# Patient Record
Sex: MALE | Race: WHITE | NOT HISPANIC OR LATINO | Employment: OTHER | URBAN - METROPOLITAN AREA
[De-identification: names, ages, dates, MRNs, and addresses within clinical notes are randomized per-mention and may not be internally consistent; named-entity substitution may affect disease eponyms.]

---

## 2018-06-29 ENCOUNTER — HOSPITAL ENCOUNTER (EMERGENCY)
Facility: HOSPITAL | Age: 33
Discharge: HOME | End: 2018-06-29
Admitting: EMERGENCY MEDICINE
Payer: COMMERCIAL

## 2018-06-29 VITALS
OXYGEN SATURATION: 98 % | RESPIRATION RATE: 16 BRPM | DIASTOLIC BLOOD PRESSURE: 89 MMHG | WEIGHT: 170 LBS | HEART RATE: 76 BPM | SYSTOLIC BLOOD PRESSURE: 150 MMHG | TEMPERATURE: 98 F

## 2018-06-29 DIAGNOSIS — L03.019 PARONYCHIA OF FINGER: Primary | ICD-10-CM

## 2018-06-29 PROCEDURE — 99283 EMERGENCY DEPT VISIT LOW MDM: CPT

## 2018-06-29 RX ORDER — BUPIVACAINE HYDROCHLORIDE 2.5 MG/ML
5 INJECTION, SOLUTION EPIDURAL; INFILTRATION; INTRACAUDAL ONCE
Status: COMPLETED | OUTPATIENT
Start: 2018-06-29 | End: 2018-06-29

## 2018-06-29 RX ORDER — CEPHALEXIN 250 MG/1
500 CAPSULE ORAL 4 TIMES DAILY
Qty: 28 CAPSULE | Refills: 0 | Status: SHIPPED | OUTPATIENT
Start: 2018-06-29 | End: 2018-07-06

## 2018-06-29 RX ORDER — SULFAMETHOXAZOLE AND TRIMETHOPRIM 800; 160 MG/1; MG/1
1 TABLET ORAL 2 TIMES DAILY
Qty: 14 TABLET | Refills: 0 | Status: SHIPPED | OUTPATIENT
Start: 2018-06-29 | End: 2018-07-06

## 2018-06-29 RX ORDER — LIDOCAINE HYDROCHLORIDE 10 MG/ML
INJECTION, SOLUTION EPIDURAL; INFILTRATION; INTRACAUDAL; PERINEURAL
Status: COMPLETED
Start: 2018-06-29 | End: 2018-06-29

## 2018-06-29 RX ORDER — IBUPROFEN 600 MG/1
600 TABLET ORAL EVERY 6 HOURS PRN
Qty: 15 TABLET | Refills: 0 | Status: SHIPPED | OUTPATIENT
Start: 2018-06-29 | End: 2019-01-01

## 2018-06-29 RX ORDER — ALBUTEROL SULFATE 90 UG/1
2 AEROSOL, METERED RESPIRATORY (INHALATION) EVERY 6 HOURS
COMMUNITY
Start: 2017-10-31 | End: 2018-10-31

## 2018-06-29 RX ORDER — LIDOCAINE HYDROCHLORIDE 10 MG/ML
5 INJECTION, SOLUTION EPIDURAL; INFILTRATION; INTRACAUDAL; PERINEURAL ONCE
Status: COMPLETED | OUTPATIENT
Start: 2018-06-29 | End: 2018-06-29

## 2018-06-29 RX ADMIN — BUPIVACAINE HYDROCHLORIDE 5 ML: 2.5 INJECTION, SOLUTION EPIDURAL; INFILTRATION; INTRACAUDAL at 07:03

## 2018-06-29 RX ADMIN — LIDOCAINE HYDROCHLORIDE 5 ML: 10 INJECTION, SOLUTION EPIDURAL; INFILTRATION; INTRACAUDAL; PERINEURAL at 07:31

## 2018-06-29 RX ADMIN — LIDOCAINE HYDROCHLORIDE 5 ML: 10 INJECTION, SOLUTION EPIDURAL; INFILTRATION; INTRACAUDAL; PERINEURAL at 07:02

## 2018-06-29 NOTE — ED PROVIDER NOTES
History  Chief Complaint   Patient presents with    Thumb Swelling     pt c/o right thumb swelling since yesterday  denies any acute injury/strain       History provided by:  Patient  Hand Pain   Location:  Right thumb  Severity:  Moderate  Onset quality:  Gradual  Duration:  2 days  Timing:  Constant  Progression:  Worsening  Chronicity:  New  Context:  Erythema and swelling along the paronychia of the right thumb  No injury   Relieved by:  Nothing tried  Worsened by:  Palpation  Associated symptoms: no abdominal pain, no chest pain, no congestion, no cough, no diarrhea, no ear pain, no fatigue, no fever, no headaches, no loss of consciousness, no myalgias, no nausea, no rash, no rhinorrhea, no shortness of breath, no sore throat, no vomiting and no wheezing        Prior to Admission Medications   Prescriptions Last Dose Informant Patient Reported? Taking? albuterol (PROVENTIL HFA,VENTOLIN HFA) 90 mcg/act inhaler   Yes Yes   Sig: Inhale 2 puffs every 6 (six) hours      Facility-Administered Medications: None       Past Medical History:   Diagnosis Date    Clubbing of fingers        History reviewed  No pertinent surgical history  History reviewed  No pertinent family history  I have reviewed and agree with the history as documented  Social History   Substance Use Topics    Smoking status: Current Every Day Smoker     Packs/day: 1 00     Types: Cigarettes    Smokeless tobacco: Never Used    Alcohol use Yes      Comment: daily beer        Review of Systems   Constitutional: Negative for activity change, appetite change, chills, fatigue and fever  HENT: Negative for congestion, ear pain, rhinorrhea and sore throat  Respiratory: Negative for cough, shortness of breath and wheezing  Cardiovascular: Negative for chest pain  Gastrointestinal: Negative for abdominal pain, diarrhea, nausea and vomiting  Musculoskeletal: Negative for myalgias  Skin: Positive for color change and wound   Negative for rash  Neurological: Negative for loss of consciousness and headaches  Psychiatric/Behavioral: Negative for confusion  Physical Exam  Physical Exam   Constitutional: He is oriented to person, place, and time  He appears well-developed and well-nourished  No distress  HENT:   Head: Normocephalic  Right Ear: External ear normal    Left Ear: External ear normal    Nose: Nose normal    Eyes: Conjunctivae are normal  Left eye exhibits no discharge  Musculoskeletal:   Examination of the right thumb-there is an infected paronychia present  There is full range of motion of the thumb in all planes  Neurological: He is alert and oriented to person, place, and time  Skin: He is not diaphoretic  There is erythema  Examination of the right thumb-there is inflammation to the paronychia of the right thumb  Will plan I and D to drain the area  There is fluctuance present  Capillary refills less than 2 seconds  Psychiatric: He has a normal mood and affect  Nursing note and vitals reviewed        Vital Signs  ED Triage Vitals   Temperature Pulse Respirations Blood Pressure SpO2   06/29/18 0654 06/29/18 0651 06/29/18 0651 06/29/18 0651 06/29/18 0651   98 °F (36 7 °C) 80 17 156/96 100 %      Temp Source Heart Rate Source Patient Position - Orthostatic VS BP Location FiO2 (%)   06/29/18 0654 06/29/18 0651 06/29/18 0651 06/29/18 0651 --   Oral Monitor Sitting Left arm       Pain Score       06/29/18 0651       Worst Possible Pain           Vitals:    06/29/18 0651 06/29/18 0738   BP: 156/96 150/89   Pulse: 80 76   Patient Position - Orthostatic VS: Sitting Sitting       Visual Acuity      ED Medications  Medications   lidocaine (PF) (XYLOCAINE-MPF) 1 % injection 5 mL (5 mL Infiltration Given by Other 6/29/18 0702)   bupivacaine (PF) (MARCAINE) 0 25 % injection 5 mL (5 mL Infiltration Given by Other 6/29/18 0703)   lidocaine (PF) (XYLOCAINE-MPF) 1 % injection 5 mL (5 mL Infiltration Given 6/29/18 0731) Diagnostic Studies  Results Reviewed     None                 No orders to display              Procedures  Incision/Drainage  Date/Time: 6/29/2018 7:17 AM  Performed by: Carmelina Sal  Authorized by: Carmelina Sal     Patient location:  ED  Other Assisting Provider: No    Consent:     Consent obtained:  Verbal    Consent given by:  Patient    Risks discussed:  Incomplete drainage, pain, bleeding and infection    Alternatives discussed:  No treatment  Universal protocol:     Procedure explained and questions answered to patient or proxy's satisfaction: yes      Site/side marked: yes      Immediately prior to procedure a time out was called: yes      Patient identity confirmed:  Verbally with patient  Location:     Indications for incision and drainage: paronychia  Location: right thumb  Pre-procedure details:     Skin preparation:  Betadine  Anesthesia (see MAR for exact dosages): Anesthesia method:  Local infiltration    Local anesthetic:  Bupivacaine 0 25% w/o epi and lidocaine 1% w/o epi  Procedure details:     Complexity:  Simple    Needle aspiration: no      Incision types:  Single straight    Scalpel blade:  11    Approach:  Open    Incision depth:  Skin    Wound management:  Irrigated with saline    Irrigation with saline:  Copiously    Drainage:  Purulent    Drainage amount:  Scant    Wound treatment:  Wound left open    Packing materials:  None  Post-procedure details:     Patient tolerance of procedure:   Tolerated well, no immediate complications           Phone Contacts  ED Phone Contact    ED Course                               MDM  Number of Diagnoses or Management Options  Paronychia of finger: new and does not require workup  Risk of Complications, Morbidity, and/or Mortality  Presenting problems: moderate  Diagnostic procedures: moderate  Management options: moderate  General comments: Patient presents emergency room with complaints of pain and swelling over his right thumb  He was seen and evaluated and diagnosed with a infected paronychia  Incision and drainage was performed  The patient was placed on antibiotics  He was instructed to remove the dressings tomorrow and start warm soaks for 20 minutes 3 to 4 times a day  He will return to the emergency room if he has any further problems  Patient Progress  Patient progress: stable    CritCare Time    Disposition  Final diagnoses:   Paronychia of finger - right thumb     Time reflects when diagnosis was documented in both MDM as applicable and the Disposition within this note     Time User Action Codes Description Comment    6/29/2018  7:19 AM Sallie Bui [[id number]] Paronychia of finger     6/29/2018  7:20 AM Sole Rubin [[id number]] Paronychia of finger right thumb      ED Disposition     ED Disposition Condition Comment    Discharge  Shauna Octavioist discharge to home/self care  Condition at discharge: Good        Follow-up Information     Follow up With Specialties Details Why Contact Info    Mikal Deluca MD Sleep Medicine, Family Medicine In 2 days  [address] [place] [address]  N  [phone]            Discharge Medication List as of 6/29/2018  7:23 AM      START taking these medications    Details   cephalexin (KEFLEX) 250 mg capsule Take 2 capsules (500 mg total) by mouth 4 (four) times a day for 7 days, Starting Fri 6/29/2018, Until Fri 7/6/2018, Print      ibuprofen (MOTRIN) 600 mg tablet Take 1 tablet (600 mg total) by mouth every 6 (six) hours as needed for mild pain for up to 15 doses, Starting Fri 6/29/2018, Print      sulfamethoxazole-trimethoprim (BACTRIM DS) 800-160 mg per tablet Take 1 tablet by mouth 2 (two) times a day for 7 days smx-tmp DS (BACTRIM) 800-160 mg tabs (1tab q12 D10), Starting Fri 6/29/2018, Until Fri 7/6/2018, Print           No discharge procedures on file      ED Provider  Electronically Signed by           Lashaun Springer PA-C  06/29/18 Annie Myrick

## 2018-06-29 NOTE — DISCHARGE INSTRUCTIONS
Paronychia   WHAT YOU NEED TO KNOW:   Paronychia is an infection of your nail fold caused by bacteria or a fungus  The nail fold is the skin around your nail  Paronychia may happen suddenly and last for 6 weeks or longer  You may have paronychia on more than 1 finger or toe  DISCHARGE INSTRUCTIONS:   Medicines:   · Td vaccine  is a booster shot used to help prevent tetanus and diphtheria  The Td booster may be given to adolescents and adults every 10 years or for certain wounds and injuries  · Antibiotics: This medicine will help fight or prevent an infection  It may be given as a pill, cream, or ointment  · Steroids: This medicine will help decrease inflammation  It may be given as a pill, cream, or ointment  · Antifungal medicine: This medicine helps kill fungus that may be causing your infection  It may be given as a cream or ointment  · NSAIDs:  These medicines decrease pain and swelling  NSAIDs are available without a doctor's order  Ask your healthcare provider which medicine is right for you  Ask how much to take and when to take it  Take as directed  NSAIDs can cause stomach bleeding and kidney problems if not taken correctly  · Take your medicine as directed  Contact your healthcare provider if you think your medicine is not helping or if you have side effects  Tell him of her if you are allergic to any medicine  Keep a list of the medicines, vitamins, and herbs you take  Include the amounts, and when and why you take them  Bring the list or the pill bottles to follow-up visits  Carry your medicine list with you in case of an emergency  Follow up with your healthcare provider as directed:  Write down your questions so you remember to ask them during your visits  Self-care:   · Soak your nail:  Soak your nail in a mixture of equal parts vinegar and water 3 or 4 times each day  This will help decrease inflammation      · Apply a warm compress:  Soak a washcloth in warm water and place it on your nail  This will help decrease inflammation  · Elevate:  Raise your nail above the level of your heart as often as you can  This will help decrease swelling and pain  Prop your nail on pillows or blankets to keep it elevated comfortably  · Use lotion:  Apply lotion after you wash your hands  This will prevent your skin from becoming too dry  Prevent paronychia:   · Avoid chemicals and allergens that may harm your skin and nails  This includes soaps, laundry detergents, and nail products  · Keep your nails clean and dry  Avoid soaking your nails in water  Use cotton-lined rubber gloves or wear 2 rubber gloves if you work with food or water  The gloves will help protect your nail folds  · Keep your nails short  Do not bite your nails, pick at your hangnails, suck your fingers, or wear fake nails  Bring your own nail tools when you go to the nail salon  Contact your healthcare provider if:   · Your nail becomes loose, deformed, or falls off  · You have a large abscess on your nail  · You have questions or concerns about your condition or care  Return to the emergency department if:   · You have severe nail pain  · The inflammation spreads to your hand or arm  © 2017 [facility] Information is for End User's use only and may not be sold, redistributed or otherwise used for commercial purposes  All illustrations and images included in CareNotes® are the copyrighted property of [employer]  or [employer]  The above information is an  only  It is not intended as medical advice for individual conditions or treatments  Talk to your doctor, nurse or pharmacist before following any medical regimen to see if it is safe and effective for you

## 2018-07-02 ENCOUNTER — HOSPITAL ENCOUNTER (EMERGENCY)
Facility: HOSPITAL | Age: 33
Discharge: HOME | End: 2018-07-02
Attending: EMERGENCY MEDICINE
Payer: COMMERCIAL

## 2018-07-02 VITALS
OXYGEN SATURATION: 98 % | WEIGHT: 149.69 LBS | RESPIRATION RATE: 16 BRPM | DIASTOLIC BLOOD PRESSURE: 84 MMHG | SYSTOLIC BLOOD PRESSURE: 142 MMHG | TEMPERATURE: 97.4 F | HEART RATE: 96 BPM

## 2018-07-02 DIAGNOSIS — L03.011 PARONYCHIA OF RIGHT THUMB: Primary | ICD-10-CM

## 2018-07-02 PROCEDURE — 99283 EMERGENCY DEPT VISIT LOW MDM: CPT

## 2018-07-02 RX ORDER — LIDOCAINE HYDROCHLORIDE 10 MG/ML
5 INJECTION, SOLUTION EPIDURAL; INFILTRATION; INTRACAUDAL; PERINEURAL ONCE
Status: COMPLETED | OUTPATIENT
Start: 2018-07-02 | End: 2018-07-02

## 2018-07-02 RX ORDER — BACITRACIN, NEOMYCIN, POLYMYXIN B 400; 3.5; 5 [USP'U]/G; MG/G; [USP'U]/G
1 OINTMENT TOPICAL ONCE
Status: COMPLETED | OUTPATIENT
Start: 2018-07-02 | End: 2018-07-02

## 2018-07-02 RX ORDER — CLINDAMYCIN HYDROCHLORIDE 300 MG/1
300 CAPSULE ORAL EVERY 6 HOURS
Qty: 20 CAPSULE | Refills: 0 | Status: SHIPPED | OUTPATIENT
Start: 2018-07-02 | End: 2018-07-07

## 2018-07-02 RX ADMIN — LIDOCAINE HYDROCHLORIDE 5 ML: 10 INJECTION, SOLUTION EPIDURAL; INFILTRATION; INTRACAUDAL; PERINEURAL at 15:35

## 2018-07-02 RX ADMIN — BACITRACIN, NEOMYCIN, POLYMYXIN B 1 SMALL APPLICATION: 400; 3.5; 5 OINTMENT TOPICAL at 16:04

## 2018-07-02 NOTE — ED PROVIDER NOTES
History  Chief Complaint   Patient presents with    Thumb Swelling     pt c/o right thumb swelling  was seen here and had it drained but now thinks it is worse  Pt  Was here on 6/29 for a paronychia of his R thumb - he had it I&D'ed and was placed on keflex and bactrim - it's getting better but base of R thumbnail is swollen now  No fevers, no n/v            Prior to Admission Medications   Prescriptions Last Dose Informant Patient Reported? Taking? albuterol (PROVENTIL HFA,VENTOLIN HFA) 90 mcg/act inhaler   Yes No   Sig: Inhale 2 puffs every 6 (six) hours   cephalexin (KEFLEX) 250 mg capsule   No No   Sig: Take 2 capsules (500 mg total) by mouth 4 (four) times a day for 7 days   ibuprofen (MOTRIN) 600 mg tablet   No No   Sig: Take 1 tablet (600 mg total) by mouth every 6 (six) hours as needed for mild pain for up to 15 doses   sulfamethoxazole-trimethoprim (BACTRIM DS) 800-160 mg per tablet   No No   Sig: Take 1 tablet by mouth 2 (two) times a day for 7 days smx-tmp DS (BACTRIM) 800-160 mg tabs (1tab q12 D10)      Facility-Administered Medications: None       Past Medical History:   Diagnosis Date    Clubbing of fingers        History reviewed  No pertinent surgical history  History reviewed  No pertinent family history  I have reviewed and agree with the history as documented  Social History   Substance Use Topics    Smoking status: Current Every Day Smoker     Packs/day: 1 00     Types: Cigarettes    Smokeless tobacco: Never Used    Alcohol use Yes      Comment: daily beer        Review of Systems   Constitutional: Negative for fever  Respiratory: Negative for shortness of breath  Cardiovascular: Negative for chest pain  Gastrointestinal: Negative for abdominal pain  Skin: Positive for wound  Neurological: Negative for weakness  Physical Exam  Physical Exam   Constitutional: He appears well-developed and well-nourished  HENT:   Head: Normocephalic and atraumatic     Neck: Normal range of motion  Pulmonary/Chest: Effort normal  No respiratory distress  Musculoskeletal: Normal range of motion  Neurological: He is alert  Skin:    R thumbnail - proximal end (base of thumbnail) with swelling/redness/tenderness, +n/v intact, good cap refill  Vital Signs  ED Triage Vitals [07/02/18 1440]   Temperature Pulse Respirations Blood Pressure SpO2   (!) 97 4 °F (36 3 °C) 100 17 (!) 180/93 98 %      Temp Source Heart Rate Source Patient Position - Orthostatic VS BP Location FiO2 (%)   Oral Monitor Sitting Right arm --      Pain Score       No Pain           Vitals:    07/02/18 1440 07/02/18 1442 07/02/18 1445   BP: (!) 180/93 142/84 142/84   Pulse: 100  96   Patient Position - Orthostatic VS: Sitting Sitting        Visual Acuity      ED Medications  Medications   lidocaine (PF) (XYLOCAINE-MPF) 1 % injection 5 mL (5 mL Infiltration Given 7/2/18 1535)   neomycin-bacitracin-polymyxin b (NEOSPORIN) ointment 1 small application (1 small application Topical Given 7/2/18 1604)       Diagnostic Studies  Results Reviewed     None                 No orders to display              Procedures  Incision/Drainage  Date/Time: 7/2/2018 4:00 PM  Performed by: MINAL Emerson  Authorized by: MINAL Emerson     Patient location:  ED  Consent:     Consent obtained:  Verbal    Consent given by:  Patient    Risks discussed:  Bleeding, incomplete drainage, pain and infection    Alternatives discussed:  No treatment  Universal protocol:     Patient identity confirmed:  Verbally with patient and arm band  Location:     Indications for incision and drainage: paronychia  Size:  1cm    Location: R thumb  Pre-procedure details:     Skin preparation:  Betadine  Anesthesia (see MAR for exact dosages):      Anesthesia method:  Local infiltration    Local anesthetic:  Lidocaine 1% w/o epi (2mL)  Procedure details:     Complexity:  Simple    Incision types:  Stab incision    Scalpel blade:  11 Approach:  Open    Incision depth:  Skin    Wound management:  Probed and deloculated    Drainage:  Bloody    Drainage amount: Moderate    Wound treatment:  Wound left open    Packing materials:  None  Post-procedure details:     Patient tolerance of procedure: Tolerated well, no immediate complications  Comments:      Incision made along proximal nail of R thumb (along the base), swelling decreased after drainage , pt  Felt better  Phone Contacts  ED Phone Contact    ED Course                               [facility]  CritCare Time    Disposition  Final diagnoses:   Paronychia of right thumb     Time reflects when diagnosis was documented in both MDM as applicable and the Disposition within this note     Time User Action Codes Description Comment    7/2/2018  4:01 PM Sophia Dodd Add [[id number]] Paronychia of right thumb       ED Disposition     ED Disposition Condition Comment    Discharge  Cleatis Binning discharge to home/self care      Condition at discharge: Stable        Follow-up Information     Follow up With Specialties Details Why Contact Info    Bren Mtz MD Sleep Medicine, Family Medicine   [address] [place] [address]  N  [phone]            Discharge Medication List as of 7/2/2018  4:03 PM      START taking these medications    Details   clindamycin (CLEOCIN) 300 MG capsule Take 1 capsule (300 mg total) by mouth every 6 (six) hours for 5 days, Starting Mon 7/2/2018, Until Sat 7/7/2018, Print         CONTINUE these medications which have NOT CHANGED    Details   albuterol (PROVENTIL HFA,VENTOLIN HFA) 90 mcg/act inhaler Inhale 2 puffs every 6 (six) hours, Starting Tue 10/31/2017, Until Wed 10/31/2018, Historical Med      cephalexin (KEFLEX) 250 mg capsule Take 2 capsules (500 mg total) by mouth 4 (four) times a day for 7 days, Starting Fri 6/29/2018, Until Fri 7/6/2018, Print      ibuprofen (MOTRIN) 600 mg tablet Take 1 tablet (600 mg total) by mouth every 6 (six) hours as needed for mild pain for up to 15 doses, Starting Fri 6/29/2018, Print      sulfamethoxazole-trimethoprim (BACTRIM DS) 800-160 mg per tablet Take 1 tablet by mouth 2 (two) times a day for 7 days smx-tmp DS (BACTRIM) 800-160 mg tabs (1tab q12 D10), Starting Fri 6/29/2018, Until Fri 7/6/2018, Print           No discharge procedures on file      ED Provider  Electronically Signed by           Julio Guillory MD  07/02/18 0068

## 2018-07-02 NOTE — DISCHARGE INSTRUCTIONS
Motrin/tylenol, keep wound clean    Paronychia   WHAT YOU NEED TO KNOW:   Paronychia is an infection of your nail fold caused by bacteria or a fungus  The nail fold is the skin around your nail  Paronychia may happen suddenly and last for 6 weeks or longer  You may have paronychia on more than 1 finger or toe  DISCHARGE INSTRUCTIONS:   Medicines:   · Td vaccine  is a booster shot used to help prevent tetanus and diphtheria  The Td booster may be given to adolescents and adults every 10 years or for certain wounds and injuries  · Antibiotics: This medicine will help fight or prevent an infection  It may be given as a pill, cream, or ointment  · Steroids: This medicine will help decrease inflammation  It may be given as a pill, cream, or ointment  · Antifungal medicine: This medicine helps kill fungus that may be causing your infection  It may be given as a cream or ointment  · NSAIDs:  These medicines decrease pain and swelling  NSAIDs are available without a doctor's order  Ask your healthcare provider which medicine is right for you  Ask how much to take and when to take it  Take as directed  NSAIDs can cause stomach bleeding and kidney problems if not taken correctly  · Take your medicine as directed  Contact your healthcare provider if you think your medicine is not helping or if you have side effects  Tell him of her if you are allergic to any medicine  Keep a list of the medicines, vitamins, and herbs you take  Include the amounts, and when and why you take them  Bring the list or the pill bottles to follow-up visits  Carry your medicine list with you in case of an emergency  Follow up with your healthcare provider as directed:  Write down your questions so you remember to ask them during your visits  Self-care:   · Soak your nail:  Soak your nail in a mixture of equal parts vinegar and water 3 or 4 times each day  This will help decrease inflammation      · Apply a warm compress:  Soak a washcloth in warm water and place it on your nail  This will help decrease inflammation  · Elevate:  Raise your nail above the level of your heart as often as you can  This will help decrease swelling and pain  Prop your nail on pillows or blankets to keep it elevated comfortably  · Use lotion:  Apply lotion after you wash your hands  This will prevent your skin from becoming too dry  Prevent paronychia:   · Avoid chemicals and allergens that may harm your skin and nails  This includes soaps, laundry detergents, and nail products  · Keep your nails clean and dry  Avoid soaking your nails in water  Use cotton-lined rubber gloves or wear 2 rubber gloves if you work with food or water  The gloves will help protect your nail folds  · Keep your nails short  Do not bite your nails, pick at your hangnails, suck your fingers, or wear fake nails  Bring your own nail tools when you go to the nail salon  Contact your healthcare provider if:   · Your nail becomes loose, deformed, or falls off  · You have a large abscess on your nail  · You have questions or concerns about your condition or care  Return to the emergency department if:   · You have severe nail pain  · The inflammation spreads to your hand or arm  © 2017 [address] Information is for End User's use only and may not be sold, redistributed or otherwise used for commercial purposes  All illustrations and images included in CareNotes® are the copyrighted property of [employer]  or [employer]  The above information is an  only  It is not intended as medical advice for individual conditions or treatments  Talk to your doctor, nurse or pharmacist before following any medical regimen to see if it is safe and effective for you

## 2018-11-07 ENCOUNTER — APPOINTMENT (EMERGENCY)
Dept: RADIOLOGY | Facility: HOSPITAL | Age: 33
End: 2018-11-07

## 2018-11-07 ENCOUNTER — HOSPITAL ENCOUNTER (EMERGENCY)
Facility: HOSPITAL | Age: 33
Discharge: HOME | End: 2018-11-07
Attending: EMERGENCY MEDICINE

## 2018-11-07 VITALS
SYSTOLIC BLOOD PRESSURE: 124 MMHG | BODY MASS INDEX: 20.09 KG/M2 | HEART RATE: 68 BPM | DIASTOLIC BLOOD PRESSURE: 71 MMHG | TEMPERATURE: 98.1 F | RESPIRATION RATE: 21 BRPM | HEIGHT: 76 IN | OXYGEN SATURATION: 97 % | WEIGHT: 165 LBS

## 2018-11-07 DIAGNOSIS — B34.9 VIRAL ILLNESS: Primary | ICD-10-CM

## 2018-11-07 LAB
ANION GAP SERPL CALCULATED.3IONS-SCNC: 5 MMOL/L (ref 4–13)
ATRIAL RATE: 85 BPM
BASOPHILS # BLD AUTO: 0.11 THOUSANDS/ΜL (ref 0–0.1)
BASOPHILS NFR BLD AUTO: 1 % (ref 0–1)
BUN SERPL-MCNC: 13 MG/DL (ref 5–25)
CALCIUM SERPL-MCNC: 9.4 MG/DL (ref 8.3–10.1)
CHLORIDE SERPL-SCNC: 105 MMOL/L (ref 100–108)
CO2 SERPL-SCNC: 31 MMOL/L (ref 21–32)
CREAT SERPL-MCNC: 1.29 MG/DL (ref 0.6–1.3)
EOSINOPHIL # BLD AUTO: 0.12 THOUSAND/ΜL (ref 0–0.61)
EOSINOPHIL NFR BLD AUTO: 1 % (ref 0–6)
ERYTHROCYTE [DISTWIDTH] IN BLOOD BY AUTOMATED COUNT: 14.9 % (ref 11.6–15.1)
GFR SERPL CREATININE-BSD FRML MDRD: 73 ML/MIN/1.73SQ M
GLUCOSE SERPL-MCNC: 73 MG/DL (ref 65–140)
HCT VFR BLD AUTO: 51.6 % (ref 36.5–49.3)
HGB BLD-MCNC: 16.9 G/DL (ref 12–17)
IMM GRANULOCYTES # BLD AUTO: 0.06 THOUSAND/UL (ref 0–0.2)
IMM GRANULOCYTES NFR BLD AUTO: 1 % (ref 0–2)
LYMPHOCYTES # BLD AUTO: 2.08 THOUSANDS/ΜL (ref 0.6–4.47)
LYMPHOCYTES NFR BLD AUTO: 22 % (ref 14–44)
MCH RBC QN AUTO: 28.8 PG (ref 26.8–34.3)
MCHC RBC AUTO-ENTMCNC: 32.8 G/DL (ref 31.4–37.4)
MCV RBC AUTO: 88 FL (ref 82–98)
MONOCYTES # BLD AUTO: 0.83 THOUSAND/ΜL (ref 0.17–1.22)
MONOCYTES NFR BLD AUTO: 9 % (ref 4–12)
NEUTROPHILS # BLD AUTO: 6.13 THOUSANDS/ΜL (ref 1.85–7.62)
NEUTS SEG NFR BLD AUTO: 66 % (ref 43–75)
NRBC BLD AUTO-RTO: 0 /100 WBCS
P AXIS: 55 DEGREES
PLATELET # BLD AUTO: 325 THOUSANDS/UL (ref 149–390)
PMV BLD AUTO: 8.5 FL (ref 8.9–12.7)
POTASSIUM SERPL-SCNC: 4.4 MMOL/L (ref 3.5–5.3)
PR INTERVAL: 148 MS
QRS AXIS: 85 DEGREES
QRSD INTERVAL: 92 MS
QT INTERVAL: 384 MS
QTC INTERVAL: 456 MS
RBC # BLD AUTO: 5.87 MILLION/UL (ref 3.88–5.62)
SODIUM SERPL-SCNC: 141 MMOL/L (ref 136–145)
T WAVE AXIS: 65 DEGREES
VENTRICULAR RATE: 85 BPM
WBC # BLD AUTO: 9.33 THOUSAND/UL (ref 4.31–10.16)

## 2018-11-07 PROCEDURE — 93005 ELECTROCARDIOGRAM TRACING: CPT

## 2018-11-07 PROCEDURE — 93010 ELECTROCARDIOGRAM REPORT: CPT | Performed by: INTERNAL MEDICINE

## 2018-11-07 PROCEDURE — 99284 EMERGENCY DEPT VISIT MOD MDM: CPT

## 2018-11-07 PROCEDURE — 71046 X-RAY EXAM CHEST 2 VIEWS: CPT

## 2018-11-07 PROCEDURE — 80048 BASIC METABOLIC PNL TOTAL CA: CPT | Performed by: EMERGENCY MEDICINE

## 2018-11-07 PROCEDURE — 85025 COMPLETE CBC W/AUTO DIFF WBC: CPT | Performed by: EMERGENCY MEDICINE

## 2018-11-07 PROCEDURE — 36415 COLL VENOUS BLD VENIPUNCTURE: CPT | Performed by: EMERGENCY MEDICINE

## 2018-11-08 NOTE — DISCHARGE INSTRUCTIONS
Viral Syndrome   WHAT YOU NEED TO KNOW:   Viral syndrome is a term used for a viral infection that has no clear cause  Viruses are spread easily from person to person through the air and on shared items  DISCHARGE INSTRUCTIONS:   Call 911 for the following:   · You have a seizure  · You cannot be woken  · You have chest pain or trouble breathing  Seek care immediately if:   · You have a stiff neck, a bad headache, and sensitivity to light  · You feel weak, dizzy, or confused  · You stop urinating or urinate a lot less than normal      · You cough up blood or thick, yellow or green, mucus  · You have severe abdominal pain or your abdomen is larger than usual   Contact your healthcare provider if:   · Your symptoms do not get better with treatment, or get worse, after 3 days  · You have a rash or ear pain  · You have burning when you urinate  · You have questions or concerns about your condition or care  Medicines: You may  need any of the following:  · Acetaminophen  decreases pain and fever  It is available without a doctor's order  Ask how much medicine to take and how often to take it  Follow directions  Acetaminophen can cause liver damage if not taken correctly  · NSAIDs , such as ibuprofen, help decrease swelling, pain, and fever  NSAIDs can cause stomach bleeding or kidney problems in certain people  If you take blood thinner medicine, always ask your healthcare provider if NSAIDs are safe for you  Always read the medicine label and follow directions  · Cold medicine  helps decrease swelling, control a cough, and relieve chest or nasal congestion  · Saline nasal spray  helps decrease nasal congestion  · Take your medicine as directed  Contact your healthcare provider if you think your medicine is not helping or if you have side effects  Tell him of her if you are allergic to any medicine  Keep a list of the medicines, vitamins, and herbs you take   Include the amounts, and when and why you take them  Bring the list or the pill bottles to follow-up visits  Carry your medicine list with you in case of an emergency  Manage your symptoms:   · Drink liquids as directed  to prevent dehydration  Ask how much liquid to drink each day and which liquids are best for you  Ask if you should drink an oral rehydration solution (ORS)  An ORS has the right amounts of water, salts, and sugar you need to replace body fluids  This may help prevent dehydration caused by vomiting or diarrhea  Do not drink liquids with caffeine  Drinks with caffeine can make dehydration worse  · Get plenty of rest  to help your body heal  Take naps throughout the day  Ask your healthcare provider when you can return to work and your normal activities  · Use a cool mist humidifier  to help you breathe easier if you have nasal or chest congestion  Ask your healthcare provider how to use a cool mist humidifier  · Eat honey or use cough drops  to help decrease throat discomfort  Ask your healthcare provider how much honey you should eat each day  Cough drops are available without a doctor's order  Follow directions for taking cough drops  · Do not smoke and stay away from others who smoke  Nicotine and other chemicals in cigarettes and cigars can cause lung damage  Smoking can also delay healing  Ask your healthcare provider for information if you currently smoke and need help to quit  E-cigarettes or smokeless tobacco still contain nicotine  Talk to your healthcare provider before you use these products  · Wash your hands frequently  to prevent the spread of germs to others  Use soap and water  Use gel hand  when soap and water are not available  Wash your hands after you use the bathroom, cough, or sneeze  Wash your hands before you prepare or eat food    Follow up with your healthcare provider as directed:  Write down your questions so you remember to ask them during your visits  © 2017 [facility] Information is for End User's use only and may not be sold, redistributed or otherwise used for commercial purposes  All illustrations and images included in CareNotes® are the copyrighted property of [employer]  or Alon[employer] Montelongo  The above information is an  only  It is not intended as medical advice for individual conditions or treatments  Talk to your doctor, nurse or pharmacist before following any medical regimen to see if it is safe and effective for you
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
## 2019-01-01 ENCOUNTER — HOSPITAL ENCOUNTER (EMERGENCY)
Facility: HOSPITAL | Age: 34
Discharge: HOME | End: 2019-01-01
Attending: EMERGENCY MEDICINE | Admitting: EMERGENCY MEDICINE

## 2019-01-01 ENCOUNTER — APPOINTMENT (EMERGENCY)
Dept: CT IMAGING | Facility: HOSPITAL | Age: 34
End: 2019-01-01

## 2019-01-01 VITALS
DIASTOLIC BLOOD PRESSURE: 95 MMHG | OXYGEN SATURATION: 100 % | WEIGHT: 147.71 LBS | TEMPERATURE: 98.1 F | RESPIRATION RATE: 20 BRPM | SYSTOLIC BLOOD PRESSURE: 138 MMHG | HEIGHT: 76 IN | HEART RATE: 89 BPM | BODY MASS INDEX: 17.99 KG/M2

## 2019-01-01 DIAGNOSIS — S02.92XA CLOSED EXTENSIVE FACIAL FRACTURES, INITIAL ENCOUNTER (HCC): Primary | ICD-10-CM
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
## 2019-01-01 LAB
ATRIAL RATE: 78 BPM
P AXIS: 69 DEGREES
PR INTERVAL: 154 MS
QRS AXIS: 92 DEGREES
QRSD INTERVAL: 106 MS
QT INTERVAL: 394 MS
QTC INTERVAL: 449 MS
T WAVE AXIS: 70 DEGREES
VENTRICULAR RATE: 78 BPM

## 2019-01-01 PROCEDURE — 72125 CT NECK SPINE W/O DYE: CPT

## 2019-01-01 PROCEDURE — 70486 CT MAXILLOFACIAL W/O DYE: CPT

## 2019-01-01 PROCEDURE — 70450 CT HEAD/BRAIN W/O DYE: CPT

## 2019-01-01 PROCEDURE — 93005 ELECTROCARDIOGRAM TRACING: CPT

## 2019-01-01 PROCEDURE — 99284 EMERGENCY DEPT VISIT MOD MDM: CPT

## 2019-01-01 PROCEDURE — 93010 ELECTROCARDIOGRAM REPORT: CPT | Performed by: INTERNAL MEDICINE

## 2019-01-01 RX ORDER — ALBUTEROL SULFATE 1.25 MG/3ML
1 SOLUTION RESPIRATORY (INHALATION) EVERY 6 HOURS PRN
Status: ON HOLD | COMMUNITY
End: 2020-03-07 | Stop reason: SDUPTHER

## 2019-01-01 RX ORDER — ONDANSETRON 4 MG/1
4 TABLET, ORALLY DISINTEGRATING ORAL ONCE
Status: COMPLETED | OUTPATIENT
Start: 2019-01-01 | End: 2019-01-01

## 2019-01-01 RX ORDER — PREDNISONE 20 MG/1
40 TABLET ORAL ONCE
Status: COMPLETED | OUTPATIENT
Start: 2019-01-01 | End: 2019-01-01

## 2019-01-01 RX ORDER — PREDNISONE 20 MG/1
40 TABLET ORAL DAILY
Qty: 8 TABLET | Refills: 0 | Status: SHIPPED | OUTPATIENT
Start: 2019-01-01 | End: 2019-01-05

## 2019-01-01 RX ORDER — AMOXICILLIN AND CLAVULANATE POTASSIUM 875; 125 MG/1; MG/1
1 TABLET, FILM COATED ORAL EVERY 12 HOURS SCHEDULED
Qty: 20 TABLET | Refills: 0 | Status: SHIPPED | OUTPATIENT
Start: 2019-01-01 | End: 2019-01-11

## 2019-01-01 RX ORDER — OXYCODONE HYDROCHLORIDE AND ACETAMINOPHEN 5; 325 MG/1; MG/1
1 TABLET ORAL EVERY 8 HOURS PRN
Qty: 12 TABLET | Refills: 0 | Status: SHIPPED | OUTPATIENT
Start: 2019-01-01 | End: 2019-01-06

## 2019-01-01 RX ORDER — AMOXICILLIN AND CLAVULANATE POTASSIUM 875; 125 MG/1; MG/1
1 TABLET, FILM COATED ORAL ONCE
Status: COMPLETED | OUTPATIENT
Start: 2019-01-01 | End: 2019-01-01

## 2019-01-01 RX ORDER — OXYCODONE HYDROCHLORIDE AND ACETAMINOPHEN 5; 325 MG/1; MG/1
1 TABLET ORAL ONCE
Status: COMPLETED | OUTPATIENT
Start: 2019-01-01 | End: 2019-01-01

## 2019-01-01 RX ADMIN — OXYCODONE HYDROCHLORIDE AND ACETAMINOPHEN 1 TABLET: 5; 325 TABLET ORAL at 13:15

## 2019-01-01 RX ADMIN — ONDANSETRON 4 MG: 4 TABLET, ORALLY DISINTEGRATING ORAL at 13:15

## 2019-01-01 RX ADMIN — AMOXICILLIN AND CLAVULANATE POTASSIUM 1 TABLET: 875; 125 TABLET, FILM COATED ORAL at 14:24

## 2019-01-01 RX ADMIN — PREDNISONE 40 MG: 20 TABLET ORAL at 14:24
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
## 2019-01-01 NOTE — DISCHARGE INSTRUCTIONS
Facial Fracture   WHAT YOU NEED TO KNOW:   A facial fracture is a break in one or more of the bones in your face  The bones in your face include those around your eye, your cheekbones, and the bones of your nose and jaw  A facial fracture may also cause damage to nearby tissue  DISCHARGE INSTRUCTIONS:   Medicines:   · Decongestant medicine:  Decongestants help decrease swelling in your nose and sinuses  This medicine may also help you breathe easier  · Pain medicine: You may be given a prescription medicine to decrease pain  Do not wait until the pain is severe before you take this medicine  · Steroid medicine: This medicine helps decrease swelling in your face  · Antibiotic medicine:  Antibiotic medicine helps treat an infection caused by bacteria  This medicine may be given if you have an open wound  · Take your medicine as directed  Contact your healthcare provider if you think your medicine is not helping or if you have side effects  Tell him of her if you are allergic to any medicine  Keep a list of the medicines, vitamins, and herbs you take  Include the amounts, and when and why you take them  Bring the list or the pill bottles to follow-up visits  Carry your medicine list with you in case of an emergency  Follow up with your healthcare provider as directed:  Write down your questions so you remember to ask them during your visits  Nutrition:  You may not be able to eat solid food for a period of time  You may first be started on a liquid diet  Examples of liquids you may be able to have include, water, broth, gelatin, apple juice, or lemon-lime soda pop  After a few days, you may be allowed to eat soft foods, such as applesauce, bananas, cooked cereal, cottage cheese, pudding, and yogurt  Ask for more information about the type of foods you can eat  Rehabilitation:  If you had surgery to fix your facial fracture, you may need oral and facial rehabilitation   This is done to restore normal use and movement of your facial muscles  Ask for more information about rehabilitation  Help prevent a facial fracture:   · Wear a helmet when you ride a bicycle or a motorcycle  · Wear a seatbelt at all times when you are inside a motor vehicle  · Wear protective headgear and eyewear during sporting activities  Self-care:   · Apply ice:  Ice helps decrease swelling and pain  Ice may also help prevent tissue damage  Use an ice pack or put crushed ice in a plastic bag  Cover it with a towel and place it on your face for 15 to 20 minutes every hour as directed  · Keep your head elevated:  Keep you head above the level of your heart as often as you can  This will help decrease swelling and pain  Prop your head on pillows or blankets to keep it elevated comfortably  · Avoid putting pressure on your face:      ¨ Do not sleep on the injured side of your face  Pressure on the area of your injury may cause further damage  ¨ Sneeze with your mouth open to decrease pressure on your broken facial bones  Too much pressure from a sneeze may cause your broken bones to move and cause more damage  ¨ Try not to blow your nose because it may cause more damage if you have a fracture near your eye  The pressure from blowing your nose may pinch the nerve of your eye and cause permanent damage  · Clean your mouth carefully: It may be hard to clean your teeth if have an injury or fracture near your mouth  Your will be shown the best way to do this so you do not hurt yourself  A water pick or a child-sized soft toothbrush may work well to clean your mouth  Contact your healthcare provider if:   · You are bleeding from a wound on your face  · You have double vision or you suddenly have problems with your eyesight  · You have questions or concerns about your condition or care  Return to the emergency department if:   · You have clear or pinkish fluid draining from your nose or mouth      · You have numbness in your face  · You have worsening pain in your eye or face  · You suddenly have trouble chewing or swallowing  · You suddenly feel lightheaded and short of breath  · You have chest pain when you take a deep breath or cough  You may cough up blood  · Your arm or leg feels warm, tender, and painful  It may look swollen and red  © 2017 [address] [employer] [employer] Information is for End User's use only and may not be sold, redistributed or otherwise used for commercial purposes  All illustrations and images included in CareNotes® are the copyrighted property of [employer] , [employer]  or Alon[employer] Montelongo  The above information is an  only  It is not intended as medical advice for individual conditions or treatments  Talk to your doctor, nurse or pharmacist before following any medical regimen to see if it is safe and effective for you

## 2019-03-01 ENCOUNTER — HOSPITAL ENCOUNTER (EMERGENCY)
Facility: HOSPITAL | Age: 34
Discharge: HOME | End: 2019-03-01
Attending: EMERGENCY MEDICINE | Admitting: EMERGENCY MEDICINE

## 2019-03-01 ENCOUNTER — APPOINTMENT (EMERGENCY)
Dept: RADIOLOGY | Facility: HOSPITAL | Age: 34
End: 2019-03-01

## 2019-03-01 VITALS
RESPIRATION RATE: 16 BRPM | HEART RATE: 95 BPM | SYSTOLIC BLOOD PRESSURE: 149 MMHG | OXYGEN SATURATION: 95 % | TEMPERATURE: 98.2 F | WEIGHT: 147.71 LBS | HEIGHT: 76 IN | BODY MASS INDEX: 17.99 KG/M2 | DIASTOLIC BLOOD PRESSURE: 87 MMHG

## 2019-03-01 DIAGNOSIS — R07.89 CHEST WALL PAIN: Primary | ICD-10-CM

## 2019-03-01 LAB
ATRIAL RATE: 83 BPM
P AXIS: 71 DEGREES
PR INTERVAL: 172 MS
QRS AXIS: 77 DEGREES
QRSD INTERVAL: 92 MS
QT INTERVAL: 376 MS
QTC INTERVAL: 441 MS
T WAVE AXIS: 64 DEGREES
VENTRICULAR RATE: 83 BPM

## 2019-03-01 PROCEDURE — 71101 X-RAY EXAM UNILAT RIBS/CHEST: CPT

## 2019-03-01 PROCEDURE — 93010 ELECTROCARDIOGRAM REPORT: CPT | Performed by: INTERNAL MEDICINE

## 2019-03-01 PROCEDURE — 99283 EMERGENCY DEPT VISIT LOW MDM: CPT

## 2019-03-01 PROCEDURE — 93005 ELECTROCARDIOGRAM TRACING: CPT

## 2019-03-01 RX ORDER — METHOCARBAMOL 500 MG/1
1500 TABLET, FILM COATED ORAL ONCE
Status: COMPLETED | OUTPATIENT
Start: 2019-03-01 | End: 2019-03-01

## 2019-03-01 RX ORDER — IBUPROFEN 600 MG/1
600 TABLET ORAL ONCE
Status: COMPLETED | OUTPATIENT
Start: 2019-03-01 | End: 2019-03-01

## 2019-03-01 RX ORDER — IBUPROFEN 600 MG/1
600 TABLET ORAL EVERY 6 HOURS PRN
Qty: 30 TABLET | Refills: 0 | Status: SHIPPED | OUTPATIENT
Start: 2019-03-01 | End: 2020-03-07 | Stop reason: HOSPADM

## 2019-03-01 RX ORDER — METHOCARBAMOL 500 MG/1
1000 TABLET, FILM COATED ORAL 2 TIMES DAILY
Qty: 30 TABLET | Refills: 0 | Status: SHIPPED | OUTPATIENT
Start: 2019-03-01 | End: 2020-03-07 | Stop reason: HOSPADM

## 2019-03-01 RX ADMIN — METHOCARBAMOL TABLETS 1500 MG: 500 TABLET, COATED ORAL at 10:54

## 2019-03-01 RX ADMIN — IBUPROFEN 600 MG: 600 TABLET ORAL at 11:52
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
## 2020-03-06 ENCOUNTER — APPOINTMENT (EMERGENCY)
Dept: CT IMAGING | Facility: HOSPITAL | Age: 35
End: 2020-03-06
Payer: COMMERCIAL

## 2020-03-06 ENCOUNTER — HOSPITAL ENCOUNTER (OUTPATIENT)
Facility: HOSPITAL | Age: 35
Setting detail: OBSERVATION
Discharge: HOME | End: 2020-03-07
Attending: EMERGENCY MEDICINE | Admitting: FAMILY MEDICINE
Payer: COMMERCIAL

## 2020-03-06 DIAGNOSIS — I10 HYPERTENSION: ICD-10-CM

## 2020-03-06 DIAGNOSIS — F41.8 DEPRESSION WITH ANXIETY: ICD-10-CM

## 2020-03-06 DIAGNOSIS — R09.02 HYPOXIA: ICD-10-CM

## 2020-03-06 DIAGNOSIS — T50.901A DRUG OVERDOSE, ACCIDENTAL OR UNINTENTIONAL, INITIAL ENCOUNTER: ICD-10-CM

## 2020-03-06 DIAGNOSIS — T50.901A OVERDOSE: Primary | ICD-10-CM

## 2020-03-06 DIAGNOSIS — R00.0 TACHYCARDIA: ICD-10-CM

## 2020-03-06 DIAGNOSIS — J43.9 EMPHYSEMA OF LUNG (HCC): ICD-10-CM

## 2020-03-06 DIAGNOSIS — F14.90 COCAINE USE: ICD-10-CM

## 2020-03-06 LAB
ALBUMIN SERPL BCP-MCNC: 4.3 G/DL (ref 3.5–5)
ALP SERPL-CCNC: 78 U/L (ref 46–116)
ALT SERPL W P-5'-P-CCNC: 26 U/L (ref 12–78)
AMPHETAMINES SERPL QL SCN: POSITIVE
ANION GAP SERPL CALCULATED.3IONS-SCNC: 11 MMOL/L (ref 4–13)
APAP SERPL-MCNC: <2 UG/ML (ref 10–20)
AST SERPL W P-5'-P-CCNC: 19 U/L (ref 5–45)
BARBITURATES UR QL: NEGATIVE
BASOPHILS # BLD AUTO: 0.1 THOUSANDS/ΜL (ref 0–0.1)
BASOPHILS NFR BLD AUTO: 1 % (ref 0–1)
BENZODIAZ UR QL: NEGATIVE
BILIRUB SERPL-MCNC: 0.2 MG/DL (ref 0.2–1)
BUN SERPL-MCNC: 9 MG/DL (ref 5–25)
CALCIUM SERPL-MCNC: 8.3 MG/DL (ref 8.3–10.1)
CHLORIDE SERPL-SCNC: 103 MMOL/L (ref 100–108)
CO2 SERPL-SCNC: 28 MMOL/L (ref 21–32)
COCAINE UR QL: NEGATIVE
CREAT SERPL-MCNC: 1.15 MG/DL (ref 0.6–1.3)
EOSINOPHIL # BLD AUTO: 0.21 THOUSAND/ΜL (ref 0–0.61)
EOSINOPHIL NFR BLD AUTO: 2 % (ref 0–6)
ERYTHROCYTE [DISTWIDTH] IN BLOOD BY AUTOMATED COUNT: 14.2 % (ref 11.6–15.1)
ETHANOL EXG-MCNC: 0.14 MG/DL
GFR SERPL CREATININE-BSD FRML MDRD: 84 ML/MIN/1.73SQ M
GLUCOSE SERPL-MCNC: 111 MG/DL (ref 65–140)
HCT VFR BLD AUTO: 54.5 % (ref 36.5–49.3)
HGB BLD-MCNC: 16.9 G/DL (ref 12–17)
IMM GRANULOCYTES # BLD AUTO: 0.07 THOUSAND/UL (ref 0–0.2)
IMM GRANULOCYTES NFR BLD AUTO: 1 % (ref 0–2)
LYMPHOCYTES # BLD AUTO: 3.43 THOUSANDS/ΜL (ref 0.6–4.47)
LYMPHOCYTES NFR BLD AUTO: 34 % (ref 14–44)
MCH RBC QN AUTO: 27.3 PG (ref 26.8–34.3)
MCHC RBC AUTO-ENTMCNC: 31 G/DL (ref 31.4–37.4)
MCV RBC AUTO: 88 FL (ref 82–98)
METHADONE UR QL: NEGATIVE
MONOCYTES # BLD AUTO: 0.68 THOUSAND/ΜL (ref 0.17–1.22)
MONOCYTES NFR BLD AUTO: 7 % (ref 4–12)
NEUTROPHILS # BLD AUTO: 5.59 THOUSANDS/ΜL (ref 1.85–7.62)
NEUTS SEG NFR BLD AUTO: 55 % (ref 43–75)
NRBC BLD AUTO-RTO: 0 /100 WBCS
OPIATES UR QL SCN: NEGATIVE
PCP UR QL: NEGATIVE
PLATELET # BLD AUTO: 335 THOUSANDS/UL (ref 149–390)
PMV BLD AUTO: 8.6 FL (ref 8.9–12.7)
POTASSIUM SERPL-SCNC: 3.9 MMOL/L (ref 3.5–5.3)
PROT SERPL-MCNC: 8.4 G/DL (ref 6.4–8.2)
RBC # BLD AUTO: 6.18 MILLION/UL (ref 3.88–5.62)
SALICYLATES SERPL-MCNC: <3 MG/DL (ref 3–20)
SODIUM SERPL-SCNC: 142 MMOL/L (ref 136–145)
THC UR QL: NEGATIVE
TROPONIN I SERPL-MCNC: <0.02 NG/ML
WBC # BLD AUTO: 10.08 THOUSAND/UL (ref 4.31–10.16)

## 2020-03-06 PROCEDURE — 36415 COLL VENOUS BLD VENIPUNCTURE: CPT | Performed by: EMERGENCY MEDICINE

## 2020-03-06 PROCEDURE — 71275 CT ANGIOGRAPHY CHEST: CPT

## 2020-03-06 PROCEDURE — 96375 TX/PRO/DX INJ NEW DRUG ADDON: CPT

## 2020-03-06 PROCEDURE — 85025 COMPLETE CBC W/AUTO DIFF WBC: CPT | Performed by: EMERGENCY MEDICINE

## 2020-03-06 PROCEDURE — 84484 ASSAY OF TROPONIN QUANT: CPT | Performed by: EMERGENCY MEDICINE

## 2020-03-06 PROCEDURE — 99220 PR INITIAL OBSERVATION CARE/DAY 70 MINUTES: CPT | Performed by: PHYSICIAN ASSISTANT

## 2020-03-06 PROCEDURE — 93005 ELECTROCARDIOGRAM TRACING: CPT

## 2020-03-06 PROCEDURE — 80053 COMPREHEN METABOLIC PANEL: CPT | Performed by: EMERGENCY MEDICINE

## 2020-03-06 PROCEDURE — 82075 ASSAY OF BREATH ETHANOL: CPT | Performed by: EMERGENCY MEDICINE

## 2020-03-06 PROCEDURE — 94762 N-INVAS EAR/PLS OXIMTRY CONT: CPT

## 2020-03-06 PROCEDURE — G0379 DIRECT REFER HOSPITAL OBSERV: HCPCS

## 2020-03-06 PROCEDURE — 96374 THER/PROPH/DIAG INJ IV PUSH: CPT

## 2020-03-06 PROCEDURE — 96361 HYDRATE IV INFUSION ADD-ON: CPT

## 2020-03-06 PROCEDURE — 80329 ANALGESICS NON-OPIOID 1 OR 2: CPT | Performed by: EMERGENCY MEDICINE

## 2020-03-06 PROCEDURE — 99285 EMERGENCY DEPT VISIT HI MDM: CPT | Performed by: EMERGENCY MEDICINE

## 2020-03-06 PROCEDURE — 70450 CT HEAD/BRAIN W/O DYE: CPT

## 2020-03-06 PROCEDURE — 99285 EMERGENCY DEPT VISIT HI MDM: CPT

## 2020-03-06 PROCEDURE — 80307 DRUG TEST PRSMV CHEM ANLYZR: CPT | Performed by: EMERGENCY MEDICINE

## 2020-03-06 RX ORDER — ONDANSETRON 2 MG/ML
4 INJECTION INTRAMUSCULAR; INTRAVENOUS EVERY 6 HOURS PRN
Status: DISCONTINUED | OUTPATIENT
Start: 2020-03-06 | End: 2020-03-07 | Stop reason: HOSPADM

## 2020-03-06 RX ORDER — SODIUM CHLORIDE 9 MG/ML
125 INJECTION, SOLUTION INTRAVENOUS CONTINUOUS
Status: DISPENSED | OUTPATIENT
Start: 2020-03-06 | End: 2020-03-07

## 2020-03-06 RX ORDER — THIAMINE MONONITRATE (VIT B1) 100 MG
100 TABLET ORAL DAILY
Status: DISCONTINUED | OUTPATIENT
Start: 2020-03-07 | End: 2020-03-07 | Stop reason: HOSPADM

## 2020-03-06 RX ORDER — DIAZEPAM 5 MG/ML
2.5 INJECTION, SOLUTION INTRAMUSCULAR; INTRAVENOUS ONCE
Status: COMPLETED | OUTPATIENT
Start: 2020-03-06 | End: 2020-03-06

## 2020-03-06 RX ORDER — ONDANSETRON 2 MG/ML
4 INJECTION INTRAMUSCULAR; INTRAVENOUS ONCE
Status: COMPLETED | OUTPATIENT
Start: 2020-03-06 | End: 2020-03-06

## 2020-03-06 RX ORDER — FOLIC ACID 1 MG/1
1 TABLET ORAL DAILY
Status: DISCONTINUED | OUTPATIENT
Start: 2020-03-07 | End: 2020-03-07 | Stop reason: HOSPADM

## 2020-03-06 RX ORDER — ALBUTEROL SULFATE 2.5 MG/3ML
1.25 SOLUTION RESPIRATORY (INHALATION) EVERY 6 HOURS PRN
Status: DISCONTINUED | OUTPATIENT
Start: 2020-03-06 | End: 2020-03-07 | Stop reason: HOSPADM

## 2020-03-06 RX ORDER — ACETAMINOPHEN 325 MG/1
650 TABLET ORAL EVERY 6 HOURS PRN
Status: DISCONTINUED | OUTPATIENT
Start: 2020-03-06 | End: 2020-03-07 | Stop reason: HOSPADM

## 2020-03-06 RX ORDER — NICOTINE 21 MG/24HR
1 PATCH, TRANSDERMAL 24 HOURS TRANSDERMAL DAILY
Status: DISCONTINUED | OUTPATIENT
Start: 2020-03-07 | End: 2020-03-07 | Stop reason: HOSPADM

## 2020-03-06 RX ORDER — ACETAMINOPHEN 325 MG/1
650 TABLET ORAL ONCE
Status: COMPLETED | OUTPATIENT
Start: 2020-03-06 | End: 2020-03-06

## 2020-03-06 RX ORDER — DOCUSATE SODIUM 100 MG/1
100 CAPSULE, LIQUID FILLED ORAL 2 TIMES DAILY
Status: DISCONTINUED | OUTPATIENT
Start: 2020-03-06 | End: 2020-03-07 | Stop reason: HOSPADM

## 2020-03-06 RX ORDER — ONDANSETRON 2 MG/ML
1 INJECTION INTRAMUSCULAR; INTRAVENOUS ONCE
Status: COMPLETED | OUTPATIENT
Start: 2020-03-06 | End: 2020-03-06

## 2020-03-06 RX ORDER — NALOXONE HYDROCHLORIDE 1 MG/ML
2 INJECTION PARENTERAL ONCE
Status: COMPLETED | OUTPATIENT
Start: 2020-03-06 | End: 2020-03-06

## 2020-03-06 RX ADMIN — ONDANSETRON 4 MG: 2 INJECTION INTRAMUSCULAR; INTRAVENOUS at 12:04

## 2020-03-06 RX ADMIN — ACETAMINOPHEN 650 MG: 325 TABLET, FILM COATED ORAL at 16:52

## 2020-03-06 RX ADMIN — IOHEXOL 85 ML: 350 INJECTION, SOLUTION INTRAVENOUS at 12:44

## 2020-03-06 RX ADMIN — SODIUM CHLORIDE 1000 ML: 0.9 INJECTION, SOLUTION INTRAVENOUS at 09:12

## 2020-03-06 RX ADMIN — DIAZEPAM 2.5 MG: 10 INJECTION, SOLUTION INTRAMUSCULAR; INTRAVENOUS at 10:04

## 2020-03-06 RX ADMIN — DIAZEPAM 2.5 MG: 10 INJECTION, SOLUTION INTRAMUSCULAR; INTRAVENOUS at 10:07

## 2020-03-06 RX ADMIN — SODIUM CHLORIDE 125 ML/HR: 0.9 INJECTION, SOLUTION INTRAVENOUS at 21:41
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
## 2020-03-06 NOTE — ED NOTES
1  CC: OD-Accidental  2  Is this admission due to an injury? No  3  Orientation status: AOx4  4  Abnormal vitals, labs, assessment: Tachycardia  5  Last time narcotic given: NA  6  IV medications/drips: NA  7  IV, drains, tubes 20 L AC  8  Isolation status: Standard  9  Ambulation status: Standby Assist   10  Skin: Lesions on trunk and arms bilaterally  11   ED phone number: [phone]       Rex Longoria RN  03/06/20 2758

## 2020-03-06 NOTE — ASSESSMENT & PLAN NOTE
Transient, requiring oxygen in ED  Has since resolved    · Pt currently continued on 3L via NC, is saturating in the upper 90s%  · Can wean oxygen  · Continuous pulse oximetry  · Incentive spirometry

## 2020-03-06 NOTE — ED PROVIDER NOTES
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, Raynaud's disease, who presents for evaluation of unresponsiveness  History is provided by the patient but supplemented by EMS is the patient is confused about what happened last night  The patient states that he went to a home that was under construction with 1 of his friends to help the pain house  He admits to drinking 10-12 beers and doing 2 lines of cocaine    He states the next thing that he remembers is waking up with the EMT is over him  He does not remember anything that happened over the night last night  He is unsure whether he fell, but denies any areas of pain  Specifically, denies headache, blurry vision, chest pain, shortness of breath, abdominal pain, or pain in his back, neck, or extremities  On EMS arrival, the patient was reportedly receiving rescue breaths by someone on scene who left shortly thereafter  Patient was given Narcan and bagged by EMS, after about 15 minutes he began to wake up  He had pulses the entire time  He denies using opiates  Denies additional ingestions  No seizure history  He tells me that he has had an irregular heartbeat in the past, but denies additional cardiac history  No history of DVTs or PEs  Prior to Admission Medications   Prescriptions Last Dose Informant Patient Reported? Taking?    albuterol (ACCUNEB) 1 25 MG/3ML nebulizer solution   Yes No   Sig: Take 1 ampule by nebulization every 6 (six) hours as needed for wheezing   ibuprofen (MOTRIN) 600 mg tablet   No No   Sig: Take 1 tablet (600 mg total) by mouth every 6 (six) hours as needed for mild pain for up to 3 days   methocarbamol (ROBAXIN) 500 mg tablet   No No   Sig: Take 2 tablets (1,000 mg total) by mouth 2 (two) times a day      Facility-Administered Medications: None       Past Medical History:   Diagnosis Date    Asthma     Clubbing of fingers     Heart murmur     Raynaud's disease        History reviewed  No pertinent surgical history  History reviewed  No pertinent family history  I have reviewed and agree with the history as documented  E-Cigarette/Vaping     E-Cigarette/Vaping Substances     Social History     Tobacco Use    Smoking status: Current Every Day Smoker     Packs/day: 1 00     Types: Cigarettes    Smokeless tobacco: Never Used   Substance Use Topics    Alcohol use: Yes     Alcohol/week: 12 0 standard drinks     Types: 12 Cans of beer per week     Comment: daily    Drug use: Yes     Frequency: 7 0 times per week     Types: Marijuana       Review of Systems   Constitutional: Negative for chills and fever  HENT: Negative for congestion  Eyes: Negative for visual disturbance  Respiratory: Negative for cough and shortness of breath  Cardiovascular: Negative for chest pain and leg swelling  Gastrointestinal: Negative for abdominal pain, diarrhea, nausea and vomiting  Genitourinary: Negative for dysuria and frequency  Musculoskeletal: Negative for arthralgias, back pain, neck pain and neck stiffness  Skin: Negative for rash  Neurological: Negative for weakness, numbness and headaches  Psychiatric/Behavioral: Negative for agitation, behavioral problems and confusion  Does not remember the last few hours       Physical Exam  Physical Exam   Constitutional: He is oriented to person, place, and time  He appears well-developed and well-nourished  No distress  HENT:   Head: Normocephalic     Right Ear: External ear normal    Left Ear: External ear normal    Nose: Nose normal    Mouth/Throat: Oropharynx is clear and moist    Dime-sized area of ecchymosis to the L front of the forehead, no other sign of facial trauma   Eyes: Pupils are equal, round, and reactive to light  Conjunctivae and EOM are normal    Neck: Normal range of motion  Neck supple  No midline tenderness to palpation over the cervical spine   Cardiovascular: Regular rhythm, normal heart sounds and intact distal pulses  Exam reveals no gallop and no friction rub  No murmur heard  Tachycardic   Pulmonary/Chest: Effort normal and breath sounds normal  No respiratory distress  He has no wheezes  He has no rales  Abdominal: Soft  Bowel sounds are normal  He exhibits no distension  There is no tenderness  There is no guarding  Musculoskeletal: Normal range of motion  He exhibits no edema or deformity  No midline tenderness to palpation over the T or L-spine  Neurological: He is alert and oriented to person, place, and time  He exhibits normal muscle tone  Face symmetric, tongue midline, 5/5 strength in the proximal and distal upper and lower extremities bilaterally with intact sensation to light touch throughout  CN II-XII intact  Normal finger-to-nose, rapid alternating movements, and heel-to-shin bilaterally  Normal speech, normal gait  No pronator drift  Skin: Skin is warm and dry  He is not diaphoretic         Vital Signs  ED Triage Vitals [03/06/20 0850]   Temp Pulse Respirations Blood Pressure SpO2   -- (!) 147 14 (!) 154/121 94 %      Temp src Heart Rate Source Patient Position - Orthostatic VS BP Location FiO2 (%)   -- Monitor Lying Right arm --      Pain Score       --           Vitals:    03/06/20 1200 03/06/20 1215 03/06/20 1330 03/06/20 1430   BP: (!) 163/104 155/94 135/100 154/85   Pulse: (!) 127 (!) 127 (!) 117 (!) 117   Patient Position - Orthostatic VS: Lying Lying Lying Lying         Visual Acuity  Visual Acuity      Most Recent Value   L Pupil Size (mm)  3   R Pupil Size (mm)  3          ED Medications  Medications   ondansetron (FOR EMS ONLY) (ZOFRAN) 4 mg/2 mL injection 4 mg (0 mg Does not apply Given to EMS 3/6/20 0905)   naloxone (FOR EMS ONLY) [facility]) 2 MG/2ML injection 4 mg (0 mg Does not apply Given to EMS 3/6/20 0905)   sodium chloride 0 9 % bolus 1,000 mL (0 mL Intravenous Stopped 3/6/20 1112)   diazepam (VALIUM) injection 2 5 mg (2 5 mg Intravenous Given 3/6/20 1004)   diazepam (VALIUM) injection 2 5 mg (2 5 mg Intravenous Given 3/6/20 1007)   ondansetron (ZOFRAN) injection 4 mg (4 mg Intravenous Given 3/6/20 1204)   iohexol (OMNIPAQUE) 350 MG/ML injection (MULTI-DOSE) 85 mL (85 mL Intravenous Given 3/6/20 1244)       Diagnostic Studies  Results Reviewed     Procedure Component Value Units Date/Time    Salicylate level [[id number]]  (Abnormal) Collected:  03/06/20 0913    Lab Status:  Final result Specimen:  Blood from Arm, Right Updated:  69/08/78 1691     Salicylate Lvl <3 mg/dL     Acetaminophen level-"If concentration is detectable, please discuss with medical  on call " [[id number]]  (Abnormal) Collected:  03/06/20 0913    Lab Status:  Final result Specimen:  Blood from Arm, Right Updated:  03/06/20 0948     Acetaminophen Level <2 0 ug/mL     Troponin I [[id number]]  (Normal) Collected:  03/06/20 0913    Lab Status:  Final result Specimen:  Blood from Arm, Right Updated:  03/06/20 0947     Troponin I <0 02 ng/mL     Comprehensive metabolic panel [[id number]]  (Abnormal) Collected:  03/06/20 0913    Lab Status:  Final result Specimen:  Blood from Arm, Right Updated:  03/06/20 0945     Sodium 142 mmol/L      Potassium 3 9 mmol/L      Chloride 103 mmol/L      CO2 28 mmol/L      ANION GAP 11 mmol/L      BUN 9 mg/dL      Creatinine 1 15 mg/dL      Glucose 111 mg/dL      Calcium 8 3 mg/dL      AST 19 U/L      ALT 26 U/L      Alkaline Phosphatase 78 U/L      Total Protein 8 4 g/dL      Albumin 4 3 g/dL      Total Bilirubin 0 20 mg/dL      eGFR 84 ml/min/1 73sq m     Narrative:       [place] guidelines for Chronic Kidney Disease (CKD):     Stage 1 with normal or high GFR (GFR > 90 mL/min/1 73 square meters)    Stage 2 Mild CKD (GFR = 60-89 mL/min/1 73 square meters)    Stage 3A Moderate CKD (GFR = 45-59 mL/min/1 73 square meters)    Stage 3B Moderate CKD (GFR = 30-44 mL/min/1 73 square meters)    Stage 4 Severe CKD (GFR = 15-29 mL/min/1 73 square meters)    Stage 5 End Stage CKD (GFR <15 mL/min/1 73 square meters)  Note: GFR calculation is accurate only with a steady state creatinine    Rapid drug screen, urine [[id number]]  (Abnormal) Collected:  03/06/20 0914    Lab Status:  Final result Specimen:  Urine, Clean Catch Updated:  03/06/20 0941     Amph/Meth UR Positive     Barbiturate Ur Negative     Benzodiazepine Urine Negative     Cocaine Urine Negative     Methadone Urine Negative     Opiate Urine Negative     PCP Ur Negative     THC Urine Negative    Narrative:       FOR MEDICAL PURPOSES ONLY  IF CONFIRMATION NEEDED PLEASE CONTACT THE LAB WITHIN 5 DAYS      Drug Screen Cutoff Levels:  AMPHETAMINE/METHAMPHETAMINES  1000 ng/mL  BARBITURATES     200 ng/mL  BENZODIAZEPINES     200 ng/mL  COCAINE      300 ng/mL  METHADONE      300 ng/mL  OPIATES      300 ng/mL  PHENCYCLIDINE     25 ng/mL  THC       50 ng/mL      POCT alcohol breath test [[id number]]  (Normal) Resulted:  03/06/20 0931    Lab Status:  Final result Updated:  03/06/20 0931     EXTBreath Alcohol 0 138    CBC and differential [[id number]]  (Abnormal) Collected:  03/06/20 0913    Lab Status:  Final result Specimen:  Blood from Arm, Right Updated:  03/06/20 0918     WBC 10 08 Thousand/uL      RBC 6 18 Million/uL      Hemoglobin 16 9 g/dL      Hematocrit 54 5 %      MCV 88 fL      MCH 27 3 pg      MCHC 31 0 g/dL      RDW 14 2 %      MPV 8 6 fL      Platelets 393 Thousands/uL      nRBC 0 /100 WBCs      Neutrophils Relative 55 %      Immat GRANS % 1 %      Lymphocytes Relative 34 %      Monocytes Relative 7 %      Eosinophils Relative 2 %      Basophils Relative 1 %      Neutrophils Absolute 5 59 Thousands/µL      Immature Grans Absolute 0 07 Thousand/uL      Lymphocytes Absolute 3 43 Thousands/µL      Monocytes Absolute 0 68 Thousand/µL      Eosinophils Absolute 0 21 Thousand/µL      Basophils Absolute 0 10 Thousands/µL                  CTA ED chest PE Study   Final Result by Enrique Hyde MD (03/06 1300)      No acute pathology  No pulmonary embolism  Minimal emphysema  Workstation performed: [id number]         CT head without contrast   Final Result by Yue Ness MD (03/06 1003)      No acute intracranial abnormality  Workstation performed: [id number]                    Procedures  Procedures         ED Course         HEART Risk Score      Most Recent Value   Heart Score Risk Calculator   History  0 Filed at: 03/06/2020 1448   ECG  1 Filed at: 03/06/2020 1448   Age  0 Filed at: 03/06/2020 1448   Risk Factors  0 Filed at: 03/06/2020 1448   Troponin  0 Filed at: 03/06/2020 1448   HEART Score  1 Filed at: 03/06/2020 1448                      Wells' Criteria for PE      Most Recent Value   Wells' Criteria for PE   Clinical signs and symptoms of DVT  0 Filed at: 03/06/2020 1129   PE is primary diagnosis or equally likely  3 Filed at: 03/06/2020 1129   HR >100  1 5 Filed at: 03/06/2020 1129   Immobilization at least 3 days or Surgery in the previous 4 weeks  0 Filed at: 03/06/2020 1129   Previous, objectively diagnosed PE or DVT  --   Hemoptysis  0 Filed at: 03/06/2020 1129   Malignancy with treatment within 6 months or palliative  0 Filed at: 03/06/2020 1129   Wells' Criteria Total  4 5 Filed at: 03/06/2020 1129            MDM  Number of Diagnoses or Management Options  Cocaine use: new and requires workup  Hypertension: new and requires workup  Hypoxia: new and requires workup  Overdose: new and requires workup  Tachycardia: new and requires workup  Diagnosis management comments: On arrival the patient is alert and oriented x4 but does not remember anything after using cocaine last night    He has a small dime-sized bruise on the left side of his forehead and is unsure how it got there  CT head with no acute intracranial abnormalities  He denies areas of pain and no other acute signs of trauma on exam     Patient's initial EKG revealed tachycardia to 145 beats per minute, questionable atrial fibrillation, normal axis, normal intervals, no ischemic changes  EKG repeated after fluids, now shows sinus tachycardia to 126 beats per minute with first-degree AV block, normal axis, normal intervals, no ischemic changes  No findings that would be consistent with WPW, Brugada, or HOCM  Troponin obtained in the setting of risk stratification that is undetectable  Patient was noted to be markedly hypertensive with persistent tachycardia to the 140s despite 2 L of IV normal saline  Given history of cocaine use, he was given 5 mg of Valium with improvement in blood pressure and some improvement in his tachycardia  Following Valium administration, he did become hypoxic with SpO2 of 88% on room air  Restarted on 2 L oxygen by nasal cannula  CTA obtained of the chest without evidence of PE or other acute abnormalities including aspiration  Suspect that transient hypoxia was likely in the setting of Valium administration  Patient has a normal neurologic exam and no history of seizures  Suspect that his period of unresponsiveness with secondary to drug use  UDS is positive only for amphetamines, L original alcohol level elevated to 0 138  As the patient remains persistently tachycardic, will admit from a tablet a shin of substances  Patient admitted in stable condition           Amount and/or Complexity of Data Reviewed  Clinical lab tests: reviewed and ordered  Tests in the radiology section of CPT®: ordered and reviewed  Review and summarize past medical records: yes  Discuss the patient with other providers: yes  Independent visualization of images, tracings, or specimens: yes    Patient Progress  Patient progress: stable      Disposition  Final diagnoses:   Overdose   Hypertension   Tachycardia   Hypoxia   Cocaine use     Time reflects when diagnosis was documented in both MDM as applicable and the Disposition within this note     Time User Action Codes Description Comment    3/6/2020  2:19 PM Shaune Shutter Add Karen Verdugo Overdose     3/6/2020  2:19 PM Shaun Pateros Hypertension     3/6/2020  2:19 PM Shaune Shutter Add [R00 0] Tachycardia     3/6/2020  2:19 PM Shaune Shutter Add [R09 02] Hypoxia     3/6/2020  2:19 PM Shaune Shutter Add [F14 90] Cocaine use       ED Disposition     ED Disposition Condition Date/Time Comment    Admit Stable Fri Mar 6, 2020  2:19 PM Case was discussed with HARESH and the patient's admission status was agreed to be Admission Status: observation status to the service of Dr Ankush Barksdale  Follow-up Information    None         Patient's Medications   Discharge Prescriptions    No medications on file     No discharge procedures on file      PDMP Review     None          ED Provider  Electronically Signed by           Hoda Rainey MD  03/06/20 1478

## 2020-03-07 VITALS
RESPIRATION RATE: 18 BRPM | HEIGHT: 75 IN | SYSTOLIC BLOOD PRESSURE: 148 MMHG | BODY MASS INDEX: 22.45 KG/M2 | OXYGEN SATURATION: 95 % | DIASTOLIC BLOOD PRESSURE: 82 MMHG | TEMPERATURE: 98.1 F | WEIGHT: 180.56 LBS | HEART RATE: 82 BPM

## 2020-03-07 PROBLEM — J43.9 EMPHYSEMA OF LUNG (HCC): Status: ACTIVE | Noted: 2020-03-07

## 2020-03-07 LAB
ANION GAP SERPL CALCULATED.3IONS-SCNC: 5 MMOL/L (ref 4–13)
BASOPHILS # BLD AUTO: 0.1 THOUSANDS/ΜL (ref 0–0.1)
BASOPHILS NFR BLD AUTO: 1 % (ref 0–1)
BUN SERPL-MCNC: 10 MG/DL (ref 5–25)
CALCIUM SERPL-MCNC: 8.1 MG/DL (ref 8.3–10.1)
CHLORIDE SERPL-SCNC: 104 MMOL/L (ref 100–108)
CO2 SERPL-SCNC: 29 MMOL/L (ref 21–32)
CREAT SERPL-MCNC: 0.93 MG/DL (ref 0.6–1.3)
EOSINOPHIL # BLD AUTO: 0.25 THOUSAND/ΜL (ref 0–0.61)
EOSINOPHIL NFR BLD AUTO: 2 % (ref 0–6)
ERYTHROCYTE [DISTWIDTH] IN BLOOD BY AUTOMATED COUNT: 13.9 % (ref 11.6–15.1)
GFR SERPL CREATININE-BSD FRML MDRD: 108 ML/MIN/1.73SQ M
GLUCOSE SERPL-MCNC: 100 MG/DL (ref 65–140)
HCT VFR BLD AUTO: 45.1 % (ref 36.5–49.3)
HGB BLD-MCNC: 14.1 G/DL (ref 12–17)
IMM GRANULOCYTES # BLD AUTO: 0.07 THOUSAND/UL (ref 0–0.2)
IMM GRANULOCYTES NFR BLD AUTO: 1 % (ref 0–2)
LYMPHOCYTES # BLD AUTO: 2.78 THOUSANDS/ΜL (ref 0.6–4.47)
LYMPHOCYTES NFR BLD AUTO: 23 % (ref 14–44)
MCH RBC QN AUTO: 27.5 PG (ref 26.8–34.3)
MCHC RBC AUTO-ENTMCNC: 31.3 G/DL (ref 31.4–37.4)
MCV RBC AUTO: 88 FL (ref 82–98)
MONOCYTES # BLD AUTO: 1.12 THOUSAND/ΜL (ref 0.17–1.22)
MONOCYTES NFR BLD AUTO: 9 % (ref 4–12)
NEUTROPHILS # BLD AUTO: 8.06 THOUSANDS/ΜL (ref 1.85–7.62)
NEUTS SEG NFR BLD AUTO: 64 % (ref 43–75)
NRBC BLD AUTO-RTO: 0 /100 WBCS
PLATELET # BLD AUTO: 235 THOUSANDS/UL (ref 149–390)
PMV BLD AUTO: 9.2 FL (ref 8.9–12.7)
POTASSIUM SERPL-SCNC: 3.7 MMOL/L (ref 3.5–5.3)
RBC # BLD AUTO: 5.12 MILLION/UL (ref 3.88–5.62)
SODIUM SERPL-SCNC: 138 MMOL/L (ref 136–145)
TROPONIN I SERPL-MCNC: <0.02 NG/ML
WBC # BLD AUTO: 12.38 THOUSAND/UL (ref 4.31–10.16)

## 2020-03-07 PROCEDURE — 85025 COMPLETE CBC W/AUTO DIFF WBC: CPT | Performed by: PHYSICIAN ASSISTANT

## 2020-03-07 PROCEDURE — 80048 BASIC METABOLIC PNL TOTAL CA: CPT | Performed by: PHYSICIAN ASSISTANT

## 2020-03-07 PROCEDURE — 99217 PR OBSERVATION CARE DISCHARGE MANAGEMENT: CPT | Performed by: STUDENT IN AN ORGANIZED HEALTH CARE EDUCATION/TRAINING PROGRAM

## 2020-03-07 PROCEDURE — 94762 N-INVAS EAR/PLS OXIMTRY CONT: CPT

## 2020-03-07 PROCEDURE — 93005 ELECTROCARDIOGRAM TRACING: CPT

## 2020-03-07 PROCEDURE — 84484 ASSAY OF TROPONIN QUANT: CPT | Performed by: STUDENT IN AN ORGANIZED HEALTH CARE EDUCATION/TRAINING PROGRAM

## 2020-03-07 RX ORDER — ALBUTEROL SULFATE 90 UG/1
2 AEROSOL, METERED RESPIRATORY (INHALATION) EVERY 6 HOURS PRN
Qty: 8.5 G | Refills: 0 | Status: SHIPPED | OUTPATIENT
Start: 2020-03-07

## 2020-03-07 RX ORDER — LANOLIN ALCOHOL/MO/W.PET/CERES
100 CREAM (GRAM) TOPICAL DAILY
Qty: 30 TABLET | Refills: 0 | Status: SHIPPED | OUTPATIENT
Start: 2020-03-08

## 2020-03-07 RX ORDER — FOLIC ACID 1 MG/1
1 TABLET ORAL DAILY
Qty: 30 TABLET | Refills: 0 | Status: SHIPPED | OUTPATIENT
Start: 2020-03-08

## 2020-03-07 RX ORDER — ALBUTEROL SULFATE 1.25 MG/3ML
1 SOLUTION RESPIRATORY (INHALATION) EVERY 6 HOURS PRN
Qty: 30 VIAL | Refills: 0 | Status: SHIPPED | OUTPATIENT
Start: 2020-03-07

## 2020-03-07 RX ADMIN — THIAMINE HCL TAB 100 MG 100 MG: 100 TAB at 10:06

## 2020-03-07 RX ADMIN — SODIUM CHLORIDE 125 ML/HR: 0.9 INJECTION, SOLUTION INTRAVENOUS at 05:26

## 2020-03-07 RX ADMIN — Medication 1 TABLET: at 10:06

## 2020-03-07 RX ADMIN — FOLIC ACID 1 MG: 1 TABLET ORAL at 10:06

## 2020-03-07 RX ADMIN — ONDANSETRON 4 MG: 2 INJECTION INTRAMUSCULAR; INTRAVENOUS at 05:26

## 2020-03-07 RX ADMIN — DOCUSATE SODIUM 100 MG: 100 CAPSULE, LIQUID FILLED ORAL at 10:06

## 2020-03-07 RX ADMIN — NICOTINE 1 PATCH: 21 PATCH TRANSDERMAL at 10:06

## 2020-03-07 NOTE — PLAN OF CARE
Problem: Potential for Falls  Goal: Patient will remain free of falls  Description  INTERVENTIONS:  - Assess patient frequently for physical needs  -  Identify cognitive and physical deficits and behaviors that affect risk of falls    -  [place] fall precautions as indicated by assessment   - Educate patient/family on patient safety including physical limitations  - Instruct patient to call for assistance with activity based on assessment  - Modify environment to reduce risk of injury  - Consider OT/PT consult to assist with strengthening/mobility  Outcome: Progressing     Problem: PAIN - ADULT  Goal: Verbalizes/displays adequate comfort level or baseline comfort level  Description  Interventions:  - Encourage patient to monitor pain and request assistance  - Assess pain using appropriate pain scale  - Administer analgesics based on type and severity of pain and evaluate response  - Implement non-pharmacological measures as appropriate and evaluate response  - Consider cultural and social influences on pain and pain management  - Notify physician/advanced practitioner if interventions unsuccessful or patient reports new pain  Outcome: Progressing     Problem: SAFETY ADULT  Goal: Maintain or return to baseline ADL function  Description  INTERVENTIONS:  -  Assess patient's ability to carry out ADLs; assess patient's baseline for ADL function and identify physical deficits which impact ability to perform ADLs (bathing, care of mouth/teeth, toileting, grooming, dressing, etc )  - Assess/evaluate cause of self-care deficits   - Assess range of motion  - Assess patient's mobility; develop plan if impaired  - Assess patient's need for assistive devices and provide as appropriate  - Encourage maximum independence but intervene and supervise when necessary  - Involve family in performance of ADLs  - Assess for home care needs following discharge   - Consider OT consult to assist with ADL evaluation and planning for discharge  - Provide patient education as appropriate  Outcome: Progressing  Goal: Maintain or return mobility status to optimal level  Description  INTERVENTIONS:  - Assess patient's baseline mobility status (ambulation, transfers, stairs, etc )    - Identify cognitive and physical deficits and behaviors that affect mobility  - Identify mobility aids required to assist with transfers and/or ambulation (gait belt, sit-to-stand, lift, walker, cane, etc )  - [place] fall precautions as indicated by assessment  - Record patient progress and toleration of activity level on Mobility SBAR; progress patient to next Phase/Stage  - Instruct patient to call for assistance with activity based on assessment  - Consider rehabilitation consult to assist with strengthening/weightbearing, etc   Outcome: Progressing     Problem: DISCHARGE PLANNING  Goal: Discharge to home or other facility with appropriate resources  Description  INTERVENTIONS:  - Identify barriers to discharge w/patient and caregiver  - Arrange for needed discharge resources and transportation as appropriate  - Identify discharge learning needs (meds, wound care, etc )  - Arrange for interpretive services to assist at discharge as needed  - Refer to Case Management Department for coordinating discharge planning if the patient needs post-hospital services based on physician/advanced practitioner order or complex needs related to functional status, cognitive ability, or social support system  Outcome: Progressing     Problem: Knowledge Deficit  Goal: Patient/family/caregiver demonstrates understanding of disease process, treatment plan, medications, and discharge instructions  Description  Complete learning assessment and assess knowledge base    Interventions:  - Provide teaching at level of understanding  - Provide teaching via preferred learning methods  Outcome: Progressing

## 2020-03-07 NOTE — UTILIZATION REVIEW
Initial Clinical Review    Admission: Date/Time/Statement: Admission Orders (From admission, onward)     Ordered        03/06/20 1420  Place in Observation (expected length of stay for this patient is less than two midnights)  Once                   Orders Placed This Encounter   Procedures    Place in Observation (expected length of stay for this patient is less than two midnights)     Standing Status:   Standing     Number of Occurrences:   1     Order Specific Question:   Admitting Physician     Answer:   Oliver Ortega [[id number]]     Order Specific Question:   Level of Care     Answer:   Med Surg [16]     ED Arrival Information     Expected Arrival Acuity Means of Arrival Escorted By Service Admission Type    - 3/6/2020 08:47 [facility]) General Medicine Emergency    Arrival Complaint    OVERDOSE        Chief Complaint   Patient presents with    Overdose - Accidental     Pt brought in via EMS following an accidental overdose of unknown drugs  Pt given intranasal narcan ons cene, was found in respiratory distress  Now alert but confused  HPI:   Darion Diego is a 30 y o  male with history of asthma and anxiety /depression , alcohol abuse, who presented to the ED with acute accidental drug overdose  Patient reports he was at a party last night and drank a 12 pack of beer  He also reports he started 2 lines of what he believed was cocaine  He reports he does not remember anything else from last night, and the next thing he remembers he is being woken up by EMS and being brought to the hospital   Patient reports occasional cocaine use, but denies any other drug use, and denies any history of IV drug use  He also reports he drinks alcohol only on the weekends, reports he drinks are from 8-12 beers on weekend days  Urine drug screen positive for methamphetamines, no cocaine or benzos  EKG sinus tachycardia  Required oxygen in the ED, currently on 3L O2 NC       ED Physical exam: He is oriented to person, place, and time  Dime-sized area of ecchymosis to the L front of the forehead, no other sign of facial trauma, tachycardic  Plan: Admit for evaluation and treatment of accidental drug overdose: IV fluids, CIWA monitoring, telemetry, continuous pulse oximetry, wean oxygen, incentive spirometry         ED Triage Vitals   Temperature Pulse Respirations Blood Pressure SpO2   03/06/20 1430 03/06/20 0850 03/06/20 0850 03/06/20 0850 03/06/20 0850   97 7 °F (36 5 °C) (!) 147 14 (!) 154/121 94 %      Temp Source Heart Rate Source Patient Position - Orthostatic VS BP Location FiO2 (%)   03/06/20 1430 03/06/20 0850 03/06/20 0850 03/06/20 0850 --   Oral Monitor Lying Right arm       Pain Score       03/06/20 2031       3        Wt Readings from Last 1 Encounters:   03/06/20 81 9 kg (180 lb 8 9 oz)     Additional Vital Signs:     3/7 @ 0700 CIWA Score = 1    Date/Time  Temp  Pulse  Resp  BP   SpO2  O2 Device    03/07/20 0700  97 8 °F (36 6 °C)  76  18  139/84   97 %  None (Room air)    03/07/20 0330  98 3 °F (36 8 °C)  93  19  129/73   96 %  None (Room air)    03/06/20 2347  98 1 °F (36 7 °C)  111Abnormal   18  149/77   95 %  None (Room air)    03/06/20 2145  --  --  --  --   99 %  None (Room air)    03/06/20 2004  98 °F (36 7 °C)  114Abnormal   17  132/82   97 %  None (Room air)    03/06/20 1730  --  106Abnormal   12  154/70   98 %  None (Room air)    03/06/20 1645  --  111Abnormal   18  154/102Abnormal    96 %  Nasal cannula    03/06/20 1630  --  107Abnormal   12  153/96   98 %  None (Room air)    03/06/20 1430  97 7 °F (36 5 °C)  117Abnormal   23Abnormal   154/85   98 %  None (Room air)    03/06/20 1330  --  117Abnormal   14  135/100   100 %  None (Room air)    03/06/20 1215  --  127Abnormal   18  155/94   96 %  None (Room air)    03/06/20 1200  --  127Abnormal   18  163/104Abnormal    99 %  None (Room air)    03/06/20 1145  --  125Abnormal   16  136/85   95 %  None (Room air)    03/06/20 1100  -- 136Abnormal   14  145/105Abnormal    95 %  None (Room air)    03/06/20 1045  --  133Abnormal   16  150/102Abnormal    98 %  None (Room air)    03/06/20 1030  --  134Abnormal   17  146/59   98 %  None (Room air)    03/06/20 1008  --  138Abnormal   24Abnormal   178/120Abnormal    95 %  None (Room air)    03/06/20 1000  --  148Abnormal   26Abnormal   178/117Abnormal    97 %  None (Room air)    03/06/20 0930  --  139Abnormal   24Abnormal   178/118Abnormal    98 %  --    03/06/20 0914  --  138Abnormal   22  152/117Abnormal    97 %  --    03/06/20 0907  --  --  --  --   --  Nasal cannula 2L      Date and Time Eye Opening Best Verbal Response Best Motor Response [place] Coma Scale Score   03/06/20 2031 4 5 6 15   03/06/20 0907 3 4 6 13       Pertinent Labs/Diagnostic Test Results:     3/6 CTA chest:  No acute pathology  No pulmonary embolism  Minimal emphysema  3/6 CT head:  No acute intracranial abnormality  3/6 EKG: sinus tachycardia, first degree AV block, no ischemic changes         Results from last 7 days   Lab Units 03/07/20  0508 03/06/20  0913   WBC Thousand/uL 12 38* 10 08   HEMOGLOBIN g/dL 14 1 16 9   HEMATOCRIT % 45 1 54 5*   PLATELETS Thousands/uL 235 335   NEUTROS ABS Thousands/µL 8 06* 5 59         Results from last 7 days   Lab Units 03/07/20  0508 03/06/20  0913   SODIUM mmol/L 138 142   POTASSIUM mmol/L 3 7 3 9   CHLORIDE mmol/L 104 103   CO2 mmol/L 29 28   ANION GAP mmol/L 5 11   BUN mg/dL 10 9   CREATININE mg/dL 0 93 1 15   EGFR ml/min/1 73sq m 108 84   CALCIUM mg/dL 8 1* 8 3     Results from last 7 days   Lab Units 03/06/20  0913   AST U/L 19   ALT U/L 26   ALK PHOS U/L 78   TOTAL PROTEIN g/dL 8 4*   ALBUMIN g/dL 4 3   TOTAL BILIRUBIN mg/dL 0 20         Results from last 7 days   Lab Units 03/07/20  0508 03/06/20  0913   GLUCOSE RANDOM mg/dL 100 111         Results from last 7 days   Lab Units 03/06/20  0913   TROPONIN I ng/mL <0 02         Results from last 7 days   Lab Units 03/06/20  0914 AMPH/METH  Positive*   BARBITURATE UR  Negative   BENZODIAZEPINE UR  Negative   COCAINE UR  Negative   METHADONE URINE  Negative   OPIATE UR  Negative   PCP UR  Negative   THC UR  Negative     Results from last 7 days   Lab Units 03/06/20  0913   ACETAMINOPHEN LVL ug/mL <7 7*   SALICYLATE LVL mg/dL <3*     ED Treatment:   Medication Administration from 03/06/2020 0847 to 03/06/2020 1956       Date/Time Order Dose Route Action     03/06/2020 0905 ondansetron (FOR EMS ONLY) (ZOFRAN) 4 mg/2 mL injection 4 mg 0 mg Does not apply Given to EMS     03/06/2020 0905 naloxone (FOR EMS ONLY) [facility]) 2 MG/2ML injection 4 mg 0 mg Does not apply Given to EMS     03/06/2020 0912 sodium chloride 0 9 % bolus 1,000 mL 1,000 mL Intravenous New Bag     03/06/2020 1004 diazepam (VALIUM) injection 2 5 mg 2 5 mg Intravenous Given     03/06/2020 1007 diazepam (VALIUM) injection 2 5 mg 2 5 mg Intravenous Given     03/06/2020 1204 ondansetron (ZOFRAN) injection 4 mg 4 mg Intravenous Given     03/06/2020 1244 iohexol (OMNIPAQUE) 350 MG/ML injection (MULTI-DOSE) 85 mL 85 mL Intravenous Given     03/06/2020 1652 acetaminophen (TYLENOL) tablet 650 mg 650 mg Oral Given        Past Medical History:   Diagnosis Date    Asthma     Clubbing of fingers     Heart murmur     Raynaud's disease      Present on Admission:   Drug overdose, accidental or unintentional, initial encounter   Tobacco abuse      Admitting Diagnosis: Overdose [T50 901A]  Tachycardia [R00 0]  Hypertension [I10]  Hypoxia [R09 02]  Cocaine use [F14 90]  Overdose, accidental or unintentional, initial encounter [T50 901A]  Age/Sex: 30 y o  male  Admission Orders:      Scheduled Medications:    Medications:  docusate sodium 100 mg Oral BID   folic acid 1 mg Oral Daily   multivitamin-minerals 1 tablet Oral Daily   nicotine 1 patch Transdermal Daily   thiamine 100 mg Oral Daily     Continuous IV Infusions:    sodium chloride 125 mL/hr Intravenous Continuous     PRN Meds:    acetaminophen 650 mg Oral Q6H PRN   albuterol 1 25 mg Nebulization Q6H PRN   naloxone 0 04 mg Intravenous Q1MIN PRN   ondansetron 4 mg Intravenous Q6H PRN         Network Utilization Review Department  [email or web address]  ATTENTION: Please call with any questions or concerns to [phone] and carefully listen to the prompts so that you are directed to the right person  All voicemails are confidential   Altaf Pronto all requests for admission clinical reviews, approved or denied determinations and any other requests to dedicated fax number below belonging to the campus where the patient is receiving treatment   List of dedicated fax numbers for the Facilities:  [address] DENIALS (Administrative/Medical Necessity) [phone]   [address] (Maternity/NICU/Pediatrics) [phone]   Rajiv Cuenca [phone]   [facility] [phone]   Hebert Shaverudder [phone]   Nitesh Night [phone]   [address] [facility] [address] [facility] [phone]   [facility]  [phone]   [address] [facility] [address]  [address] [address] [address] [facility] [phone]

## 2020-03-07 NOTE — DISCHARGE INSTRUCTIONS
Cigarette Smoking and Your Health   WHAT YOU NEED TO KNOW:   What are the risks to my health if I smoke tobacco?  Nicotine and other chemicals found in tobacco damage every cell in your body  Even if you are a light smoker, you have an increased risk for cancer, heart disease, and lung disease  If you are pregnant or have diabetes, smoking increases your risk for complications  What are the benefits to my health if I stop smoking? · You decrease respiratory symptoms such as coughing, wheezing, and shortness of breath  · You reduce your risk for cancers of the lung, mouth, throat, kidney, bladder, pancreas, stomach, and cervix  If you already have cancer, you increase the benefits of chemotherapy  You also reduce your risk for cancer returning or a second cancer from developing  · You reduce your risk for heart disease, blood clots, heart attack, and stroke  · You reduce your risk for lung infections, and diseases such as pneumonia, asthma, chronic bronchitis, and emphysema  · Your circulation improves  More oxygen can be delivered to your body  If you have diabetes, you lower your risk for complications, such as kidney, artery, and eye diseases  You also lower your risk for nerve damage  Nerve damage can lead to amputations, poor vision, and blindness  · You improve your body's ability to heal and to fight infections  What are the health benefits to others if I stop smoking? Tobacco is harmful to nonsmokers who breathe in your secondhand smoke  The following are ways the health of others around you may improve when you stop smoking:  · You lower the risks for lung cancer and heart disease in nonsmoking adults  · If you are pregnant, you lower the risk for miscarriage, early delivery, low birth weight, and stillbirth  You also lower your baby's risk for SIDS, obesity, developmental delay, and neurobehavioral problems, such as ADHD       · If you have children, you lower their risk for ear infections, colds, pneumonia, bronchitis, and asthma  Where can I find more information and support to stop smoking? · [employer]  Phone: 0- 127 - 818-7399  Web Address: www [employer]  CARE AGREEMENT:   You have the right to help plan your care  Learn about your health condition and how it may be treated  Discuss treatment options with your caregivers to decide what care you want to receive  You always have the right to refuse treatment  The above information is an  only  It is not intended as medical advice for individual conditions or treatments  Talk to your doctor, nurse or pharmacist before following any medical regimen to see if it is safe and effective for you  © 2017 [employer]  Information is for End User's use only and may not be sold, redistributed or otherwise used for commercial purposes  All illustrations and images included in CareNotes® are the copyrighted property of [employer]  or Alonzo Montelongo  How to Stop Smoking   WHAT YOU NEED TO KNOW:   You will improve your health and the health of others around you if you stop smoking  Your risk for heart and lung disease, cancer, stroke, heart attack, and vision problems will also decrease  You can benefit from quitting no matter how long you have smoked  DISCHARGE INSTRUCTIONS:   Prepare to stop smoking:  Nicotine is a highly addictive drug found in cigarettes  Withdrawal symptoms can happen when you stop smoking and make it hard to quit  These include anxiety, depression, irritability, trouble sleeping, and increased appetite  You increase your chances of success if you prepare to quit  · Set a quit date  Luis Enrique Sport a date that is within the next 2 weeks  Do not pick a day that you think may be stressful or busy  Write down the day or [place] it on your calender  · Tell friends and family that you plan to quit    Explain that you may have withdrawal symptoms when you try to quit  Ask them to support you  They may be able to encourage you and help reduce your stress to make it easier for you to quit  · Make a list of your reasons for quitting  Put the list somewhere you will see it every day, such as your refrigerator  You can look at the list when you have a craving  · Remove all tobacco and nicotine products from your home, car, and workplace  Also, remove anything else that will tempt you to smoke, such as lighters, matches, or ashtrays  Clean your car, home, and places at work that smell like smoke  The smell of smoke can trigger a craving  · Identify triggers that make you want to smoke  This may include activities, feelings, or people  Also write down 1 way you can deal with each of your triggers  For example, if you want to smoke as soon as you wake up, plan another activity during this time, such as exercise  · Make a plan for how you will quit  Learn about the tools that can help you quit, such as medicine, counseling, or nicotine replacement therapy  Choose at least 2 options to help you quit  Tools to help you stop smoking:   · Counseling  from a trained healthcare provider can provide you with support and skills to quit smoking  The provider will also teach you to manage your withdrawal symptoms and cravings  You may receive counseling from one counselor, in group therapy, or through phone therapy called a quit line  · Nicotine replacement therapy (NRT)  such as nicotine patches, gum, or lozenges may help reduce your nicotine cravings  You may get these without a doctor's order  Do not use e-cigarettes or smokeless tobacco in place of cigarettes or to help you quit  They still contain nicotine  · Prescription medicines  such as nasal sprays or nicotine inhalers may help reduce your withdrawal symptoms  Other medicines may also be used to reduce your urge to smoke  Ask your healthcare provider about these medicines   You may need to start certain medicines 2 weeks before your quit date for them to work well  · Hypnosis  is a practice that helps guide you through thoughts and feelings  Hypnosis may help decrease your cravings and make you more willing to quit  · Acupuncture therapy  uses very thin needles to balance energy channels in the body  This is thought to help decrease cravings and symptoms of nicotine withdrawal      · Support groups  let you talk to others who are trying to quit or have already quit  It may be helpful to speak with others about how they quit  Manage your cravings:   · Avoid situations, people, and places that tempt you to smoke  Go to nonsmoking places, such as libraries or restaurants  Understand what tempts you and try to avoid these things  · Keep your hands busy  Hold things such as a stress ball or pen  · Put candy or toothpicks in your mouth  Keep lollipops, sugarless gum, or toothpicks with you at all times  · Do not have alcohol or caffeine  These drinks may tempt you to smoke  Drink healthy liquids such as water or juice instead  · Reward yourself when you resist your cravings  Rewards will motivate you and help you stay positive  · Do an activity that distracts you from your craving  Examples include going for a walk, exercising, or cleaning  Prevent weight gain after you quit:  You may gain a few pounds after you quit smoking  It is healthier for you to gain a few pounds than to continue to smoke  The following can help you prevent weight gain:  · Eat healthy foods  These include fruits, vegetables, whole-grain breads, low-fat dairy products, beans, lean meats, and fish  Eat healthy snacks, such as low-fat yogurt, if you get hungry between meals  · Drink water before, during, and between meals  This will make your stomach feel full and help prevent you from overeating  Ask your healthcare provider how much liquid to drink each day and which liquids are best for you  · Exercise  Take a walk or do some kind of exercise every day  Ask your healthcare provider what exercise is right for you  This may help reduce your cravings and reduce stress  For support and more information:   · [employer]  Phone: 6- 384 - 039-2080  Web Address: www [employer]  © 2017 [address] Information is for End User's use only and may not be sold, redistributed or otherwise used for commercial purposes  All illustrations and images included in CareNotes® are the copyrighted property of [employer]  or Alonzo Montelongo  The above information is an  only  It is not intended as medical advice for individual conditions or treatments  Talk to your doctor, nurse or pharmacist before following any medical regimen to see if it is safe and effective for you  Lung Cancer Screening   WHAT YOU NEED TO KNOW:   What is lung cancer screening? Lung cancer screening is a test done every year to find lung cancer early  Screening is different from diagnosis because screening is used before you have any signs or symptoms  Screening may be helpful if you are or were a heavy smoker, or if you smoked for many years  This is because smoking increases your risk for lung cancer  Lung cancer screening has benefits and risks  Talk with your healthcare provider about the benefits and risks to help you decide if lung cancer screening is right for you  What do I need to know about lung cancer? · Lung cancer cells can form a tumor in your lungs and can spread to other areas of your body  This cancer is often found after it has spread  By this time, it is more difficult to treat  Treatment may include surgery to remove lung tissue that contains cancer cells  · Lung cancer is the leading cause of cancer deaths  Each year, about 220,000 people find out that they have lung cancer  About 150,000 people die each year from lung cancer       · Half of the people who have lung cancer are over the age of 79  The most common age of people who die from lung cancer is 67  · Talk to your healthcare provider if you think you have signs or symptoms of lung cancer  Examples include chest pain, a cough that does not go away, coughing up blood, wheezing, hoarseness, and neck swelling  Am I a good candidate for lung cancer screening? You may be able to have lung cancer screening if the following are true:  · You are between 54and [de-identified]years old  · You have never had lung cancer  · You do not currently have any signs or symptoms of lung cancer  · You currently smoke, or you quit less than 15 years ago  You also are or were a heavy smoker (30 pack-years or more)  · You do not have any other serious conditions that may affect how long you live  · You are willing to have surgery if screening shows signs of lung cancer  · You are willing to have screening every year until you have not smoked for 15 years or reach 80years of age  Screening may also stop if you develop another health condition or you are no longer willing to have treatment  What are pack-years? Pack-years are the number of cigarette packs you smoked multiplied by the number of years you smoked  A heavy smoker has 30 pack-years or more  Examples of 30 pack-years are 1 pack of cigarettes smoked each day for 30 years, or 2 packs each day for 15 years  Your healthcare provider can help you calculate your pack-years  How is lung cancer screening done? Lung cancer screening is done with a low-dose CT scan (LDCT)  A CT uses an x-ray and a computer to give detailed pictures of your lungs  You will lie on a table for this test  A low amount of radiation from the x-ray machine is used to take pictures of your lungs  This test only takes a few minutes  You will be asked to hold your breath for about 6 seconds while the pictures are taken  What are the benefits of lung cancer screening? · LDCT can find some kinds of lung cancer early  This means it can be treated with less invasive surgery, or less lung tissue may need to be removed  · The scan is not invasive or painful, and takes only a short amount of time  · LDCT does not leave radiation in your body after the scan is done  · LDCT can lower your risk of death from lung cancer by 16% to 20% because the cancer is found early  Your risk for death from any cause is lowered by 6 7%  This is because the scan may show signs of problems other than lung cancer that need to be treated  What are the risks of lung cancer screening? · LDCT exposes you to a small amount of radiation that can increase your risk for cancer  The amount is less than is given during a mammogram  It is about the same amount you get from the sun in a year  Because you will need to be screened every year, your total radiation exposure over time is increased  · The test can give a false-positive result  This means that screening shows you have lung cancer, but follow-up tests show that you do not  You may get more tests or even treatments that are not needed  It can also be stressful to think you have lung cancer when you do not  The risk for a false-positive result is about 23%  · The test can give a false-negative result  This means that the test does not find signs of cancer, but you later develop signs and symptoms and need treatment  A false-negative result can keep you from getting treatment as early as possible  The risk for a false-negative result is about 4%  · Screening may lead to over-diagnosis if tumors are found that are not life-threatening  Some forms of cancer grow quickly and need to be treated  Other forms grow slowly and may not be life-threatening in your lifetime  · Screening may lead to over-treatment  This means you get treatment that is not necessary  About 10% of people with lung cancer receive treatment that was not needed  What happens after I have lung cancer screening?   You will meet with your healthcare provider to go over the results of your screening  You may need more tests to diagnose anything that showed up on the screening test   Lung cancer screening needs to be done each year  Even if your result shows you do not have lung cancer, it is important to continue getting screened each year  This is the best way to find a new cancer, at the earliest possible stage  What questions should I ask my healthcare provider to help me make a decision about screening? · How high is my risk for lung cancer? · What are my pack-years? · Will I be able to help create my treatment plan if screening shows I have lung cancer? · Will my insurance cover screening? · Where is the screening done? · Do I need to do anything to get ready to have screening? · When and how do I get the results of my screening? What do I need to think about before I decide to have lung cancer screening? · Benefits and risks of lung cancer screening:      ¨ Do I understand the benefits and risks of lung cancer screening with LDCT? How concerned am I about the risks? Do I think the benefits are greater than the risks? ¨ One of the main risks is that the radiation I get during screening can cause cancer  Do I understand how high the risk is? How worried am I about the risk? · Possibility of finding lung cancer: How will I feel if I find out I have lung cancer? Am I willing to get the treatment I might need? · Possibility of false results:  I might get a false-positive or false-negative result  How will I feel if the test results are wrong? · Need for annual screening:  Screening needs to be done every year  Am I willing to have screening until I am a nonsmoker for 15 years, am 80, or develop another condition? Screening will also stop if I am no longer willing to have treatment  How do I feel about the need for surgery or other treatment for lung cancer?  Will I be comfortable with my decision not to have treatment, and to stop having screening? What do I need to know about quitting smoking? If you currently smoke, it is very important that you quit  If you already quit smoking, it is important that you do not start smoking again  You will also need to avoid secondhand smoke from others  Nicotine and other chemicals in cigarettes and cigars increase your risk for cancer  Your risk for lung cancer gets lower each year that you do not smoke  Even if you get lung cancer screening every year, it is still important not to smoke  Ask your healthcare provider for information if you currently smoke and need help to quit  You can find support and more information here:  · [employer]  Phone: 2- 531 - 259-5208  Web Address: www smokefree  gov  Where can I find support and more information about lung cancer? · [employer]  [address] [place] [address],[address]  Phone: [facility] [address]  Phone: 1- 834 - 363-5714  Web Address: [employer]  CARE AGREEMENT:   You have the right to help plan your care  Learn about your health condition and how it may be treated  Discuss treatment options with your caregivers to decide what care you want to receive  You always have the right to refuse treatment  The above information is an  only  It is not intended as medical advice for individual conditions or treatments  Talk to your doctor, nurse or pharmacist before following any medical regimen to see if it is safe and effective for you  © 2017 [address] [employer] Information is for End User's use only and may not be sold, redistributed or otherwise used for commercial purposes  All illustrations and images included in CareNotes® are the copyrighted property of [employer]  or Alonzo Montelongo

## 2020-03-07 NOTE — SOCIAL WORK
MD request to offer pt OP D&A resources  Cm met w pt who is alert and oriented  Reports having supports  Has ride today  Cm offered him OP resources  He denied, stating that he already has OP follow up thru [employer]  However, he did not desire for CM to reach out to them  He states it was an isolated, accident and won't happen again  Pt reported no other needs at this time  Cm to follow    CM reviewed discharge planning process including the following: identifying help at home, patient preference for discharge planning needs, pharmacy preference, and availability of treatment team to discuss questions or concerns patient and/or family may have regarding understanding medications and recognizing signs and symptoms once discharged  CM also encouraged patient to follow up with all recommended appointments after discharge  Patient advised of importance for patient and family to participate in managing patients medical well being  CM name and role reviewed  Discharge Checklist reviewed and CM will continue to monitor for progress toward discharge goals in nursing and provider rounds

## 2020-03-07 NOTE — DISCHARGE INSTR - AVS FIRST PAGE
Recommended close follow-up with primary care provider in 3-5 days of discharge  Recommended close follow-up with Psychiatry outpatient

## 2020-03-07 NOTE — DISCHARGE SUMMARY
Discharge- Amado Rothman 1985, 30 y o  male MRN: [id number]    Unit/Bed#: -01 Encounter: [id number]    Primary Care Provider: Griselda Bright MD   Date and time admitted to hospital: 3/6/2020  8:47 AM        * Drug overdose, accidental or unintentional, initial encounter  Rivka Arceo  Patient presents via EMS, reports last thing he remembers was a party last night where he drank a 12 pack of beer and snorted 2 lines of what he thought was cocaine  Drug urine screen + for methamphetamines, negative for cocaine  Pt reports he only drinks on weekends, most weekends drinks 10-12 beers a day  · Currently on my encounter patient is awake, alert, oriented x3, has good insight of his condition  Patient reports that he was at his friend's house for party and snorted 2 lines of cocaine  Patient denies  suicidal, homicidal ideation  States that 'I will never do something like this ever again "  · No signs of withdrawal noted  Hemodynamically stable  Labs essentially now unremarkable  · Thiamine, folate supplementation  · Denies any history of IVDU  · Counseled heavily regarding alcohol and substance abstinence  · Cm to provide resources if patient agreeable  · Hemodynamically stable for discharge  Emphysema of lung [facility])  Assessment & Plan  CT report noted with mild emphysema  Patient reports smoking every day  Currently not in exacerbation  O2 saturation well on room air  Counseled heavily for smoking cessation  Outpatient referral for pulmonologist given at time of discharge  Recommended close outpatient follow-up  Patient agreeable with above plan  Hypoxic episode  Assessment & Plan  Resolved  Currently O2 saturation 97% on room air  Not in distress  Gabrielle Melton to go home      Tobacco abuse  Assessment & Plan  · Current one pack per day smoker  · Nicotine patch ordered  · Smoking cessation education    Depression with anxiety  Assessment & Plan  Patient reports history of depression with anxiety  Reports currently controlled  Denies suicidal, homicidal ideation  Offered inpatient psych consult which patient refuses  Cm to offer resources in the community patient agrees  Discharging Physician / Practitioner: Shane Vargas MD  PCP: Prieto Case MD  Admission Date:   Admission Orders (From admission, onward)     Ordered        03/06/20 1420  Place in Observation (expected length of stay for this patient is less than two midnights)  Once                   Discharge Date: 03/07/20    Resolved Problems  Date Reviewed: 3/7/2020    None            Reason for Admission:  Acute hypoxic respiratory failure requiring supplement oxygen likely secondary to accidental overdose  Hospital Course:     Pallavi Loomis is a 30 y o  male patient past medical history of anxiety, depression, alcohol abuse, active smoker, history of snorting cocaine who originally presented to the hospital on 3/6/2020 due to accidental drug overdose causing unresponsiveness  Patient stated that he does not remember what happened  He does states that his friends had started doing CPR because he was unconscious  Patient was also treated with Narcan by EMS  After approximately 15 minutes patient had began to wake up  No seizure-like activity reported  Presenting to ED patient was noted to have acute hypoxic respiratory failure requiring supplemental oxygen  Patient reported that he was at a party night before presented to the ED and drank about 12 packs of beer  Patient also reported that he snorted 2 lines of substance which he thought was cocaine and after that he did not remember what happened  Urine drug test positive for amphetamine  Denies IV drug use  Denies suicidal, homicidal ideation  Hypoxia now resolved  Currently O2 saturation 96-97% on room air  Patient is awake, alert, oriented x3  Troponin negative x2  Currently denies any complaints    Patient has good insight  States that he will never do something like this ever again  Currently reports feeling much better and eager to go home  Denies suicidal, homicidal ideation  No signs of withdrawal noted  Offered but patient refused inpatient psych evaluation   to provide resources in the community if patient is agreeable  No other events reported  Currently hemodynamically stable for discharge  Counseled heavily for smoking, alcohol, substance abstinence  Recommended close outpatient follow-up  Patient is agreeable with above plan  No other events reported  Refer to earlier notes for further clarification  Please see above list of diagnoses and related plan for additional information  Condition at Discharge: good     Discharge Day Visit / Exam:     Subjective:  Afebrile, hemodynamically stable  Oriented x3 with good insight  Denies chest pain, dyspnea, fever, chills, nausea, vomiting, abdominal pain, dysuria, diarrhea, any other new complaints  Reports feeling better at his baseline  Denies any new complaints  Amna Lucienks to go home  Vitals: Blood Pressure: 148/82 (03/07/20 1430)  Pulse: 82 (03/07/20 1430)  Temperature: 98 1 °F (36 7 °C) (03/07/20 1430)  Temp Source: Oral (03/07/20 1430)  Respirations: 18 (03/07/20 1430)  Height: 6' 3" (190 5 cm) (03/06/20 2004)  Weight - Scale: 81 9 kg (180 lb 8 9 oz) (03/06/20 0850)  SpO2: 95 % (03/07/20 1430)  Exam:   Physical Exam   Constitutional: He is oriented to person, place, and time  No distress  HENT:   Head: Normocephalic and atraumatic  Eyes: Pupils are equal, round, and reactive to light  EOM are normal    Neck: Normal range of motion  Neck supple  No JVD present  Cardiovascular: Normal rate and regular rhythm  Pulmonary/Chest: Effort normal and breath sounds normal  No stridor  No respiratory distress  He has no wheezes  Abdominal: Soft  Bowel sounds are normal  He exhibits no distension  There is no tenderness  Musculoskeletal: Normal range of motion  He exhibits no edema  Neurological: He is alert and oriented to person, place, and time  Skin: He is not diaphoretic  Psychiatric: His behavior is normal          Discharge instructions/Information to patient and family:   See after visit summary for information provided to patient and family  Provisions for Follow-Up Care:  See after visit summary for information related to follow-up care and any pertinent home health orders  Disposition:     Home    For Discharges to [facility] SNF:   · Not Applicable to this Patient - Not Applicable to this Patient    Planned Readmission:  None     Discharge Statement:  I spent 30 minutes discharging the patient  This time was spent on the day of discharge  I had direct contact with the patient on the day of discharge  Greater than 50% of the total time was spent examining patient, answering all patient questions, arranging and discussing plan of care with patient as well as directly providing post-discharge instructions  Additional time then spent on discharge activities  Discharge Medications:  See after visit summary for reconciled discharge medications provided to patient and family        ** Please Note: This note has been constructed using a voice recognition system **

## 2020-03-09 LAB
ATRIAL RATE: 117 BPM
ATRIAL RATE: 117 BPM
ATRIAL RATE: 127 BPM
ATRIAL RATE: 300 BPM
ATRIAL RATE: 91 BPM
ATRIAL RATE: 91 BPM
PR INTERVAL: 272 MS
QRS AXIS: 48 DEGREES
QRS AXIS: 61 DEGREES
QRS AXIS: 70 DEGREES
QRS AXIS: 70 DEGREES
QRS AXIS: 72 DEGREES
QRS AXIS: 72 DEGREES
QRS AXIS: 83 DEGREES
QRS AXIS: 83 DEGREES
QRSD INTERVAL: 86 MS
QRSD INTERVAL: 88 MS
QRSD INTERVAL: 94 MS
QRSD INTERVAL: 94 MS
QRSD INTERVAL: 96 MS
QRSD INTERVAL: 96 MS
QT INTERVAL: 276 MS
QT INTERVAL: 294 MS
QT INTERVAL: 328 MS
QT INTERVAL: 328 MS
QT INTERVAL: 360 MS
QT INTERVAL: 360 MS
QT INTERVAL: 366 MS
QT INTERVAL: 366 MS
QTC INTERVAL: 399 MS
QTC INTERVAL: 430 MS
QTC INTERVAL: 430 MS
QTC INTERVAL: 441 MS
QTC INTERVAL: 441 MS
QTC INTERVAL: 455 MS
QTC INTERVAL: 455 MS
QTC INTERVAL: 456 MS
T WAVE AXIS: 13 DEGREES
T WAVE AXIS: 25 DEGREES
T WAVE AXIS: 31 DEGREES
T WAVE AXIS: 31 DEGREES
T WAVE AXIS: 37 DEGREES
T WAVE AXIS: 37 DEGREES
T WAVE AXIS: 56 DEGREES
T WAVE AXIS: 56 DEGREES
VENTRICULAR RATE: 109 BPM
VENTRICULAR RATE: 109 BPM
VENTRICULAR RATE: 126 BPM
VENTRICULAR RATE: 145 BPM
VENTRICULAR RATE: 86 BPM
VENTRICULAR RATE: 86 BPM
VENTRICULAR RATE: 93 BPM
VENTRICULAR RATE: 93 BPM

## 2020-03-09 PROCEDURE — 93010 ELECTROCARDIOGRAM REPORT: CPT | Performed by: INTERNAL MEDICINE

## 2020-08-23 ENCOUNTER — HOSPITAL ENCOUNTER (EMERGENCY)
Facility: HOSPITAL | Age: 35
Discharge: HOME | End: 2020-08-23
Attending: EMERGENCY MEDICINE | Admitting: EMERGENCY MEDICINE
Payer: COMMERCIAL

## 2020-08-23 ENCOUNTER — APPOINTMENT (EMERGENCY)
Dept: RADIOLOGY | Facility: HOSPITAL | Age: 35
End: 2020-08-23
Payer: COMMERCIAL

## 2020-08-23 VITALS
SYSTOLIC BLOOD PRESSURE: 149 MMHG | WEIGHT: 185 LBS | BODY MASS INDEX: 22.53 KG/M2 | TEMPERATURE: 98.8 F | DIASTOLIC BLOOD PRESSURE: 86 MMHG | HEIGHT: 76 IN | HEART RATE: 88 BPM | RESPIRATION RATE: 20 BRPM | OXYGEN SATURATION: 98 %

## 2020-08-23 DIAGNOSIS — J45.909 ASTHMA: ICD-10-CM

## 2020-08-23 DIAGNOSIS — J44.1 COPD WITH ACUTE EXACERBATION (HCC): Primary | ICD-10-CM

## 2020-08-23 LAB
ANION GAP SERPL CALCULATED.3IONS-SCNC: 11 MMOL/L (ref 4–13)
BASOPHILS # BLD AUTO: 0.1 THOUSANDS/ΜL (ref 0–0.1)
BASOPHILS NFR BLD AUTO: 1 % (ref 0–2)
BUN SERPL-MCNC: 7 MG/DL (ref 7–25)
CALCIUM SERPL-MCNC: 9 MG/DL (ref 8.6–10.5)
CHLORIDE SERPL-SCNC: 107 MMOL/L (ref 98–107)
CO2 SERPL-SCNC: 25 MMOL/L (ref 21–31)
CREAT SERPL-MCNC: 0.86 MG/DL (ref 0.7–1.3)
EOSINOPHIL # BLD AUTO: 0.1 THOUSAND/ΜL (ref 0–0.61)
EOSINOPHIL NFR BLD AUTO: 1 % (ref 0–5)
ERYTHROCYTE [DISTWIDTH] IN BLOOD BY AUTOMATED COUNT: 14.5 % (ref 11.5–14.5)
GFR SERPL CREATININE-BSD FRML MDRD: 114 ML/MIN/1.73SQ M
GLUCOSE SERPL-MCNC: 88 MG/DL (ref 65–99)
HCT VFR BLD AUTO: 44.9 % (ref 42–47)
HGB BLD-MCNC: 14.8 G/DL (ref 14–18)
LYMPHOCYTES # BLD AUTO: 1.9 THOUSANDS/ΜL (ref 0.6–4.47)
LYMPHOCYTES NFR BLD AUTO: 20 % (ref 21–51)
MCH RBC QN AUTO: 28.7 PG (ref 26–34)
MCHC RBC AUTO-ENTMCNC: 32.9 G/DL (ref 31–37)
MCV RBC AUTO: 87 FL (ref 81–99)
MONOCYTES # BLD AUTO: 0.8 THOUSAND/ΜL (ref 0.17–1.22)
MONOCYTES NFR BLD AUTO: 8 % (ref 2–12)
NEUTROPHILS # BLD AUTO: 6.8 THOUSANDS/ΜL (ref 1.4–6.5)
NEUTS SEG NFR BLD AUTO: 70 % (ref 42–75)
PLATELET # BLD AUTO: 293 THOUSANDS/UL (ref 149–390)
PMV BLD AUTO: 6.7 FL (ref 8.6–11.7)
POTASSIUM SERPL-SCNC: 3.7 MMOL/L (ref 3.5–5.5)
RBC # BLD AUTO: 5.14 MILLION/UL (ref 4.3–5.9)
SODIUM SERPL-SCNC: 143 MMOL/L (ref 134–143)
TROPONIN I SERPL-MCNC: <0.03 NG/ML
WBC # BLD AUTO: 9.7 THOUSAND/UL (ref 4.8–10.8)

## 2020-08-23 PROCEDURE — 93005 ELECTROCARDIOGRAM TRACING: CPT

## 2020-08-23 PROCEDURE — 99285 EMERGENCY DEPT VISIT HI MDM: CPT | Performed by: PHYSICIAN ASSISTANT

## 2020-08-23 PROCEDURE — 36415 COLL VENOUS BLD VENIPUNCTURE: CPT | Performed by: PHYSICIAN ASSISTANT

## 2020-08-23 PROCEDURE — 85025 COMPLETE CBC W/AUTO DIFF WBC: CPT | Performed by: PHYSICIAN ASSISTANT

## 2020-08-23 PROCEDURE — 80048 BASIC METABOLIC PNL TOTAL CA: CPT | Performed by: PHYSICIAN ASSISTANT

## 2020-08-23 PROCEDURE — 99285 EMERGENCY DEPT VISIT HI MDM: CPT

## 2020-08-23 PROCEDURE — 71046 X-RAY EXAM CHEST 2 VIEWS: CPT

## 2020-08-23 PROCEDURE — 84484 ASSAY OF TROPONIN QUANT: CPT | Performed by: PHYSICIAN ASSISTANT

## 2020-08-23 RX ORDER — PREDNISONE 20 MG/1
40 TABLET ORAL DAILY
Qty: 10 TABLET | Refills: 0 | Status: SHIPPED | OUTPATIENT
Start: 2020-08-24 | End: 2020-08-29

## 2020-08-23 RX ORDER — ALBUTEROL SULFATE 90 UG/1
2 AEROSOL, METERED RESPIRATORY (INHALATION) EVERY 4 HOURS PRN
Qty: 1 INHALER | Refills: 1 | Status: SHIPPED | OUTPATIENT
Start: 2020-08-23

## 2020-08-23 RX ADMIN — PREDNISONE 50 MG: 10 TABLET ORAL at 13:24

## 2020-08-23 NOTE — ED PROVIDER NOTES
History  Chief Complaint   Patient presents with    Shortness of Breath     Woke up this AM feeling like he couldn't breath  Reports feeling congested and like he couldn't breath   Vomiting     Reports vomiting up "clear mucus"     42-year-old male history of asthma, emphysema and finger clubbing presents via EMS complaining of shortness of breath  The patient reports that he woke up this morning and felt very congested in his chest and was trying to cough up phlegm and was having considerable wheezing  He reports that he ran out of his albuterol inhaler and needed to call the ambulance because he could not breathe  The patient received 2 5 mg of albuterol nebulized in the ambulance on the way here which she reports eliminated his symptoms and that he has no complaints at this time  He did report that prior to arrival he had some left-sided chest pain that he describes as a tightness sensation without radiation without alleviating or exacerbating factors  He reports that he has previously been admitted to the hospital and was instructed to follow up with pulmonology however he has not done so yet  He does report that he is a heavy smoker and EMS reports that he was smoking a cigarette upon arrival   He denies any other complaints at this time  Denies any drug use other than marijuana however he has been positive for cocaine and methamphetamine in the past per chart review  Prior to Admission Medications   Prescriptions Last Dose Informant Patient Reported? Taking?    albuterol (ACCUNEB) 1 25 MG/3ML nebulizer solution Not Taking at Unknown time  No No   Sig: Take 3 mL (1 25 mg total) by nebulization every 6 (six) hours as needed for wheezing   Patient not taking: Reported on 8/23/2020   albuterol (ProAir HFA) 90 mcg/act inhaler Not Taking at Unknown time  No No   Sig: Inhale 2 puffs every 6 (six) hours as needed for wheezing   Patient not taking: Reported on 9/30/8113   folic acid (FOLVITE) 1 mg tablet Not Taking at Unknown time  No No   Sig: Take 1 tablet (1 mg total) by mouth daily   Patient not taking: Reported on 8/23/2020   thiamine 100 MG tablet Not Taking at Unknown time  No No   Sig: Take 1 tablet (100 mg total) by mouth daily   Patient not taking: Reported on 8/23/2020      Facility-Administered Medications: None       Past Medical History:   Diagnosis Date    Anxiety     Asthma     Clubbing of fingers     Heart murmur     PTSD (post-traumatic stress disorder)     Raynaud's disease        History reviewed  No pertinent surgical history  History reviewed  No pertinent family history  I have reviewed and agree with the history as documented  E-Cigarette/Vaping     E-Cigarette/Vaping Substances     Social History     Tobacco Use    Smoking status: Current Every Day Smoker     Packs/day: 1 00     Types: Cigarettes    Smokeless tobacco: Never Used   Substance Use Topics    Alcohol use: Yes     Alcohol/week: 12 0 standard drinks     Types: 12 Cans of beer per week     Frequency: 4 or more times a week     Drinks per session: 5 or 6     Binge frequency: Daily or almost daily     Comment: daily    Drug use: Yes     Frequency: 7 0 times per week     Types: Marijuana       Review of Systems   Constitutional: Negative for chills, fatigue and fever  HENT: Negative for congestion and sore throat  Eyes: Negative for pain  Respiratory: Negative for cough, chest tightness, shortness of breath and wheezing  Cardiovascular: Negative for chest pain, palpitations and leg swelling  Gastrointestinal: Negative for abdominal pain, constipation, diarrhea, nausea and vomiting  Endocrine: Negative for polyuria  Genitourinary: Negative for dysuria  Musculoskeletal: Negative for arthralgias, back pain, myalgias and neck pain  Skin: Negative for rash  Neurological: Negative for dizziness, syncope, light-headedness and headaches  All other systems reviewed and are negative        Physical Exam  Physical Exam  Vitals signs reviewed  Constitutional:       Appearance: He is well-developed  HENT:      Head: Normocephalic and atraumatic  Neck:      Musculoskeletal: Normal range of motion  Cardiovascular:      Rate and Rhythm: Normal rate and regular rhythm  Heart sounds: Normal heart sounds  Pulmonary:      Effort: Pulmonary effort is normal       Breath sounds: Normal breath sounds  Abdominal:      General: Bowel sounds are normal       Palpations: Abdomen is soft  Tenderness: There is no abdominal tenderness  Musculoskeletal: Normal range of motion  Skin:     General: Skin is warm and dry  Capillary Refill: Capillary refill takes less than 2 seconds  Nails: There is clubbing  Neurological:      Mental Status: He is alert and oriented to person, place, and time           Vital Signs  ED Triage Vitals [08/23/20 1307]   Temperature Pulse Respirations Blood Pressure SpO2   98 8 °F (37 1 °C) 88 20 149/86 98 %      Temp Source Heart Rate Source Patient Position - Orthostatic VS BP Location FiO2 (%)   Tympanic Monitor Sitting Left arm --      Pain Score       --           Vitals:    08/23/20 1307   BP: 149/86   Pulse: 88   Patient Position - Orthostatic VS: Sitting         Visual Acuity      ED Medications  Medications   predniSONE tablet 50 mg (50 mg Oral Given 8/23/20 1324)       Diagnostic Studies  Results Reviewed     Procedure Component Value Units Date/Time    Troponin I [[id number]]  (Normal) Collected:  08/23/20 1320    Lab Status:  Final result Specimen:  Blood from Arm, Right Updated:  08/23/20 1348     Troponin I <0 03 ng/mL     Basic metabolic panel [[id number]] Collected:  08/23/20 1320    Lab Status:  Final result Specimen:  Blood from Arm, Right Updated:  08/23/20 1347     Sodium 143 mmol/L      Potassium 3 7 mmol/L      Chloride 107 mmol/L      CO2 25 mmol/L      ANION GAP 11 mmol/L      BUN 7 mg/dL      Creatinine 0 86 mg/dL      Glucose 88 mg/dL      Calcium 9 0 mg/dL      eGFR 114 ml/min/1 73sq m     Narrative:       [place] guidelines for Chronic Kidney Disease (CKD):     Stage 1 with normal or high GFR (GFR > 90 mL/min/1 73 square meters)    Stage 2 Mild CKD (GFR = 60-89 mL/min/1 73 square meters)    Stage 3A Moderate CKD (GFR = 45-59 mL/min/1 73 square meters)    Stage 3B Moderate CKD (GFR = 30-44 mL/min/1 73 square meters)    Stage 4 Severe CKD (GFR = 15-29 mL/min/1 73 square meters)    Stage 5 End Stage CKD (GFR <15 mL/min/1 73 square meters)  Note: GFR calculation is accurate only with a steady state creatinine    CBC and differential [[id number]]  (Abnormal) Collected:  08/23/20 1320    Lab Status:  Final result Specimen:  Blood from Arm, Right Updated:  08/23/20 1328     WBC 9 70 Thousand/uL      RBC 5 14 Million/uL      Hemoglobin 14 8 g/dL      Hematocrit 44 9 %      MCV 87 fL      MCH 28 7 pg      MCHC 32 9 g/dL      RDW 14 5 %      MPV 6 7 fL      Platelets 059 Thousands/uL      Neutrophils Relative 70 %      Lymphocytes Relative 20 %      Monocytes Relative 8 %      Eosinophils Relative 1 %      Basophils Relative 1 %      Neutrophils Absolute 6 80 Thousands/µL      Lymphocytes Absolute 1 90 Thousands/µL      Monocytes Absolute 0 80 Thousand/µL      Eosinophils Absolute 0 10 Thousand/µL      Basophils Absolute 0 10 Thousands/µL                  XR chest 2 views    (Results Pending)              Procedures  ECG 12 Lead Documentation Only    Date/Time: 8/23/2020 4:31 PM  Performed by: Robert Caldwell PA-C  Authorized by:  Robert Caldwell PA-C     Indications / Diagnosis:  Sob  ECG reviewed by me, the ED Provider: yes    Patient location:  ED  Previous ECG:     Previous ECG:  Compared to current    Similarity:  No change    Comparison to cardiac monitor: Yes    Interpretation:     Interpretation: normal    Rate:     ECG rate:  78    ECG rate assessment: normal    Rhythm:     Rhythm: sinus rhythm    Ectopy:     Ectopy: none    QRS:     QRS axis:  Normal  Conduction:     Conduction: normal    ST segments:     ST segments:  Normal  T waves:     T waves: normal    Comments:      No evidence of acute cardiac ischemia             ED Course       US AUDIT      Most Recent Value   Initial Alcohol Screen: US AUDIT-C    1  How often do you have a drink containing alcohol? 6 Filed at: 08/23/2020 1309   2  How many drinks containing alcohol do you have on a typical day you are drinking? 4 Filed at: 08/23/2020 1309   3a  Male UNDER 65: How often do you have five or more drinks on one occasion? 6 Filed at: 08/23/2020 1309   Audit-C Score  (!) 16 Filed at: 08/23/2020 1309                  DHRUV/DAST-10      Most Recent Value   How many times in the past year have you    Used an illegal drug or used a prescription medication for non-medical reasons?  -- [Patient reports use of Leida Renner, with a medical card ] Filed at: 08/23/2020 1310          [address] for PE      Most Recent Value   PERC Rule for PE   Age >=50  0 Filed at: 08/23/2020 1633   HR >=100  0 Filed at: 08/23/2020 1633   O2 Sat on room air < 95%  0 Filed at: 08/23/2020 1633   History of PE or DVT  0 Filed at: 08/23/2020 1633   Recent trauma or surgery  0 Filed at: 08/23/2020 1633   Hemoptysis  0 Filed at: 08/23/2020 1633   Exogenous estrogen  0 Filed at: 08/23/2020 1633   Unilateral leg swelling  0 Filed at: 08/23/2020 1633   PERC Rule for PE Results  0 Filed at: 08/23/2020 1633                            MDM  Number of Diagnoses or Management Options  Asthma:   COPD with acute exacerbation [facility]):   Diagnosis management comments: Patient presented via EMS for shortness of breath  By the time he arrived he was no longer wheezing or having any signs of respiratory distress  He had completely clear lung sounds bilaterally when he 1st arrived  Patient was re-evaluated prior to discharge and although lungs were still clear there were a bit rhonchorous    This is likely the patient's baseline as he has a known history of emphysema  He had considerable bilateral finger clubbing on exam   He has recently had an ambulatory referral to pulmonology that he has not kept his appointment with  Had a lengthy discussion with the patient in regards to smoking cessation and the importance of following up with pulmonology  Patient offered admission however he politely declined which seemed reasonable as he was not hypoxic, tachycardic or in any acute distress  PE considered, however very unlikely given that he was not tachycardic, not hypoxic and symptoms better explained by underlying respiratory disease and improvement with albuterol  Perc of 0  Disposition  Final diagnoses:   COPD with acute exacerbation ([facility] )   Asthma     Time reflects when diagnosis was documented in both MDM as applicable and the Disposition within this note     Time User Action Codes Description Comment    8/23/2020  3:00 PM Marcy Ortega [J44 1] COPD with acute exacerbation ([facility] )     8/23/2020  3:00 PM Marcy Ortega [H94 521] Asthma       ED Disposition     ED Disposition Condition Date/Time Comment    Discharge Stable Sun Aug 23, 2020  2:57 PM Edgar Edmond discharge to home/self care  Follow-up Information     Follow up With Specialties Details Why Contact Info    Deborah Ortiz MD Sleep Medicine, Family Medicine Schedule an appointment as soon as possible for a visit   [address] [place] [address] [address]  [phone]            Discharge Medication List as of 8/23/2020  3:03 PM      START taking these medications    Details   !! albuterol (PROVENTIL HFA,VENTOLIN HFA) 90 mcg/act inhaler Inhale 2 puffs every 4 (four) hours as needed for wheezing, Starting Sun 8/23/2020, Print      predniSONE 20 mg tablet Take 2 tablets (40 mg total) by mouth daily for 5 days, Starting Mon 8/24/2020, Until Sat 8/29/2020, Print       !! - Potential duplicate medications found  Please discuss with provider  CONTINUE these medications which have NOT CHANGED    Details   albuterol (ACCUNEB) 1 25 MG/3ML nebulizer solution Take 3 mL (1 25 mg total) by nebulization every 6 (six) hours as needed for wheezing, Starting Sat 3/7/2020, Normal      !! albuterol (ProAir HFA) 90 mcg/act inhaler Inhale 2 puffs every 6 (six) hours as needed for wheezing, Starting Sat 3/9/6286, Normal      folic acid (FOLVITE) 1 mg tablet Take 1 tablet (1 mg total) by mouth daily, Starting Sun 3/8/2020, Normal      thiamine 100 MG tablet Take 1 tablet (100 mg total) by mouth daily, Starting Sun 3/8/2020, Normal       !! - Potential duplicate medications found  Please discuss with provider              PDMP Review     None          ED Provider  Electronically Signed by           Leonel Dela Cruz PA-C  08/23/20 2483

## 2020-08-24 LAB
ATRIAL RATE: 78 BPM
P AXIS: 33 DEGREES
PR INTERVAL: 162 MS
QRS AXIS: 72 DEGREES
QRSD INTERVAL: 92 MS
QT INTERVAL: 394 MS
QTC INTERVAL: 449 MS
T WAVE AXIS: 45 DEGREES
VENTRICULAR RATE: 78 BPM

## 2020-08-24 PROCEDURE — 93010 ELECTROCARDIOGRAM REPORT: CPT | Performed by: INTERNAL MEDICINE

## 2022-04-14 NOTE — DISCHARGE INSTRUCTIONS
Follow up with pulmonology  Use albuterol as needed for wheezing  Return to ER if condition worsens 
ambulatory